# Patient Record
Sex: MALE | Race: WHITE | NOT HISPANIC OR LATINO | URBAN - METROPOLITAN AREA
[De-identification: names, ages, dates, MRNs, and addresses within clinical notes are randomized per-mention and may not be internally consistent; named-entity substitution may affect disease eponyms.]

---

## 2020-01-01 ENCOUNTER — INPATIENT (INPATIENT)
Facility: HOSPITAL | Age: 85
LOS: 5 days | DRG: 177 | End: 2020-04-18
Attending: INTERNAL MEDICINE | Admitting: INTERNAL MEDICINE
Payer: MEDICARE

## 2020-01-01 VITALS
SYSTOLIC BLOOD PRESSURE: 96 MMHG | TEMPERATURE: 98 F | OXYGEN SATURATION: 80 % | HEART RATE: 59 BPM | DIASTOLIC BLOOD PRESSURE: 54 MMHG | RESPIRATION RATE: 28 BRPM

## 2020-01-01 VITALS
HEART RATE: 60 BPM | DIASTOLIC BLOOD PRESSURE: 40 MMHG | SYSTOLIC BLOOD PRESSURE: 96 MMHG | OXYGEN SATURATION: 88 % | TEMPERATURE: 98 F | RESPIRATION RATE: 28 BRPM

## 2020-01-01 DIAGNOSIS — R09.02 HYPOXEMIA: ICD-10-CM

## 2020-01-01 DIAGNOSIS — I10 ESSENTIAL (PRIMARY) HYPERTENSION: ICD-10-CM

## 2020-01-01 DIAGNOSIS — J96.01 ACUTE RESPIRATORY FAILURE WITH HYPOXIA: ICD-10-CM

## 2020-01-01 DIAGNOSIS — Z29.9 ENCOUNTER FOR PROPHYLACTIC MEASURES, UNSPECIFIED: ICD-10-CM

## 2020-01-01 DIAGNOSIS — N17.9 ACUTE KIDNEY FAILURE, UNSPECIFIED: ICD-10-CM

## 2020-01-01 LAB
ALBUMIN SERPL ELPH-MCNC: 2.2 G/DL — LOW (ref 3.5–5)
ALBUMIN SERPL ELPH-MCNC: 2.2 G/DL — LOW (ref 3.5–5)
ALBUMIN SERPL ELPH-MCNC: 2.3 G/DL — LOW (ref 3.5–5)
ALBUMIN SERPL ELPH-MCNC: 2.4 G/DL — LOW (ref 3.5–5)
ALBUMIN SERPL ELPH-MCNC: 2.7 G/DL — LOW (ref 3.5–5)
ALBUMIN SERPL ELPH-MCNC: 3 G/DL — LOW (ref 3.5–5)
ALP SERPL-CCNC: 105 U/L — SIGNIFICANT CHANGE UP (ref 40–120)
ALP SERPL-CCNC: 107 U/L — SIGNIFICANT CHANGE UP (ref 40–120)
ALP SERPL-CCNC: 109 U/L — SIGNIFICANT CHANGE UP (ref 40–120)
ALP SERPL-CCNC: 109 U/L — SIGNIFICANT CHANGE UP (ref 40–120)
ALP SERPL-CCNC: 126 U/L — HIGH (ref 40–120)
ALP SERPL-CCNC: 149 U/L — HIGH (ref 40–120)
ALT FLD-CCNC: 10 U/L DA — SIGNIFICANT CHANGE UP (ref 10–60)
ALT FLD-CCNC: 116 U/L DA — HIGH (ref 10–60)
ALT FLD-CCNC: 12 U/L DA — SIGNIFICANT CHANGE UP (ref 10–60)
ALT FLD-CCNC: 13 U/L DA — SIGNIFICANT CHANGE UP (ref 10–60)
ALT FLD-CCNC: 13 U/L DA — SIGNIFICANT CHANGE UP (ref 10–60)
ALT FLD-CCNC: 14 U/L DA — SIGNIFICANT CHANGE UP (ref 10–60)
ANION GAP SERPL CALC-SCNC: 14 MMOL/L — SIGNIFICANT CHANGE UP (ref 5–17)
ANION GAP SERPL CALC-SCNC: 3 MMOL/L — LOW (ref 5–17)
ANION GAP SERPL CALC-SCNC: 6 MMOL/L — SIGNIFICANT CHANGE UP (ref 5–17)
ANION GAP SERPL CALC-SCNC: 6 MMOL/L — SIGNIFICANT CHANGE UP (ref 5–17)
ANION GAP SERPL CALC-SCNC: 7 MMOL/L — SIGNIFICANT CHANGE UP (ref 5–17)
ANION GAP SERPL CALC-SCNC: 8 MMOL/L — SIGNIFICANT CHANGE UP (ref 5–17)
APPEARANCE UR: CLEAR — SIGNIFICANT CHANGE UP
APTT BLD: 35.4 SEC — SIGNIFICANT CHANGE UP (ref 27.5–36.3)
AST SERPL-CCNC: 124 U/L — HIGH (ref 10–40)
AST SERPL-CCNC: 15 U/L — SIGNIFICANT CHANGE UP (ref 10–40)
AST SERPL-CCNC: 15 U/L — SIGNIFICANT CHANGE UP (ref 10–40)
AST SERPL-CCNC: 17 U/L — SIGNIFICANT CHANGE UP (ref 10–40)
AST SERPL-CCNC: 26 U/L — SIGNIFICANT CHANGE UP (ref 10–40)
AST SERPL-CCNC: 27 U/L — SIGNIFICANT CHANGE UP (ref 10–40)
BASOPHILS # BLD AUTO: 0 K/UL — SIGNIFICANT CHANGE UP (ref 0–0.2)
BASOPHILS # BLD AUTO: 0.01 K/UL — SIGNIFICANT CHANGE UP (ref 0–0.2)
BASOPHILS # BLD AUTO: 0.02 K/UL — SIGNIFICANT CHANGE UP (ref 0–0.2)
BASOPHILS # BLD AUTO: 0.1 K/UL — SIGNIFICANT CHANGE UP (ref 0–0.2)
BASOPHILS NFR BLD AUTO: 0 % — SIGNIFICANT CHANGE UP (ref 0–2)
BASOPHILS NFR BLD AUTO: 0.2 % — SIGNIFICANT CHANGE UP (ref 0–2)
BASOPHILS NFR BLD AUTO: 0.3 % — SIGNIFICANT CHANGE UP (ref 0–2)
BASOPHILS NFR BLD AUTO: 0.5 % — SIGNIFICANT CHANGE UP (ref 0–2)
BILIRUB SERPL-MCNC: 0.6 MG/DL — SIGNIFICANT CHANGE UP (ref 0.2–1.2)
BILIRUB SERPL-MCNC: 0.7 MG/DL — SIGNIFICANT CHANGE UP (ref 0.2–1.2)
BILIRUB SERPL-MCNC: 1 MG/DL — SIGNIFICANT CHANGE UP (ref 0.2–1.2)
BILIRUB SERPL-MCNC: 1.1 MG/DL — SIGNIFICANT CHANGE UP (ref 0.2–1.2)
BILIRUB UR-MCNC: ABNORMAL
BUN SERPL-MCNC: 35 MG/DL — HIGH (ref 7–18)
BUN SERPL-MCNC: 40 MG/DL — HIGH (ref 7–18)
BUN SERPL-MCNC: 43 MG/DL — HIGH (ref 7–18)
BUN SERPL-MCNC: 44 MG/DL — HIGH (ref 7–18)
BUN SERPL-MCNC: 46 MG/DL — HIGH (ref 7–18)
BUN SERPL-MCNC: 60 MG/DL — HIGH (ref 7–18)
CALCIUM SERPL-MCNC: 8.1 MG/DL — LOW (ref 8.4–10.5)
CALCIUM SERPL-MCNC: 8.5 MG/DL — SIGNIFICANT CHANGE UP (ref 8.4–10.5)
CALCIUM SERPL-MCNC: 8.6 MG/DL — SIGNIFICANT CHANGE UP (ref 8.4–10.5)
CALCIUM SERPL-MCNC: 8.6 MG/DL — SIGNIFICANT CHANGE UP (ref 8.4–10.5)
CALCIUM SERPL-MCNC: 8.9 MG/DL — SIGNIFICANT CHANGE UP (ref 8.4–10.5)
CALCIUM SERPL-MCNC: 9 MG/DL — SIGNIFICANT CHANGE UP (ref 8.4–10.5)
CHLORIDE SERPL-SCNC: 101 MMOL/L — SIGNIFICANT CHANGE UP (ref 96–108)
CHLORIDE SERPL-SCNC: 105 MMOL/L — SIGNIFICANT CHANGE UP (ref 96–108)
CHLORIDE SERPL-SCNC: 106 MMOL/L — SIGNIFICANT CHANGE UP (ref 96–108)
CHLORIDE SERPL-SCNC: 106 MMOL/L — SIGNIFICANT CHANGE UP (ref 96–108)
CHLORIDE SERPL-SCNC: 109 MMOL/L — HIGH (ref 96–108)
CHLORIDE SERPL-SCNC: 110 MMOL/L — HIGH (ref 96–108)
CK SERPL-CCNC: 19 U/L — LOW (ref 35–232)
CK SERPL-CCNC: 24 U/L — LOW (ref 35–232)
CK SERPL-CCNC: 79 U/L — SIGNIFICANT CHANGE UP (ref 35–232)
CO2 SERPL-SCNC: 22 MMOL/L — SIGNIFICANT CHANGE UP (ref 22–31)
CO2 SERPL-SCNC: 27 MMOL/L — SIGNIFICANT CHANGE UP (ref 22–31)
CO2 SERPL-SCNC: 28 MMOL/L — SIGNIFICANT CHANGE UP (ref 22–31)
CO2 SERPL-SCNC: 28 MMOL/L — SIGNIFICANT CHANGE UP (ref 22–31)
CO2 SERPL-SCNC: 29 MMOL/L — SIGNIFICANT CHANGE UP (ref 22–31)
CO2 SERPL-SCNC: 31 MMOL/L — SIGNIFICANT CHANGE UP (ref 22–31)
COLOR SPEC: YELLOW — SIGNIFICANT CHANGE UP
CREAT SERPL-MCNC: 0.99 MG/DL — SIGNIFICANT CHANGE UP (ref 0.5–1.3)
CREAT SERPL-MCNC: 1.18 MG/DL — SIGNIFICANT CHANGE UP (ref 0.5–1.3)
CREAT SERPL-MCNC: 1.22 MG/DL — SIGNIFICANT CHANGE UP (ref 0.5–1.3)
CREAT SERPL-MCNC: 1.31 MG/DL — HIGH (ref 0.5–1.3)
CREAT SERPL-MCNC: 1.55 MG/DL — HIGH (ref 0.5–1.3)
CREAT SERPL-MCNC: 2.09 MG/DL — HIGH (ref 0.5–1.3)
CRP SERPL-MCNC: 1.89 MG/DL — HIGH (ref 0–0.4)
CRP SERPL-MCNC: 1.9 MG/DL — HIGH (ref 0–0.4)
CRP SERPL-MCNC: 13.39 MG/DL — HIGH (ref 0–0.4)
CRP SERPL-MCNC: 3.2 MG/DL — HIGH (ref 0–0.4)
CRP SERPL-MCNC: 6.34 MG/DL — HIGH (ref 0–0.4)
D DIMER BLD IA.RAPID-MCNC: 3662 NG/ML DDU — HIGH
D DIMER BLD IA.RAPID-MCNC: HIGH NG/ML DDU
DIFF PNL FLD: ABNORMAL
EOSINOPHIL # BLD AUTO: 0 K/UL — SIGNIFICANT CHANGE UP (ref 0–0.5)
EOSINOPHIL # BLD AUTO: 0.06 K/UL — SIGNIFICANT CHANGE UP (ref 0–0.5)
EOSINOPHIL # BLD AUTO: 0.08 K/UL — SIGNIFICANT CHANGE UP (ref 0–0.5)
EOSINOPHIL NFR BLD AUTO: 0 % — SIGNIFICANT CHANGE UP (ref 0–6)
EOSINOPHIL NFR BLD AUTO: 0.3 % — SIGNIFICANT CHANGE UP (ref 0–6)
EOSINOPHIL NFR BLD AUTO: 1 % — SIGNIFICANT CHANGE UP (ref 0–6)
FERRITIN SERPL-MCNC: 560 NG/ML — HIGH (ref 30–400)
FERRITIN SERPL-MCNC: 784 NG/ML — HIGH (ref 30–400)
GLUCOSE BLDC GLUCOMTR-MCNC: 104 MG/DL — HIGH (ref 70–99)
GLUCOSE BLDC GLUCOMTR-MCNC: 107 MG/DL — HIGH (ref 70–99)
GLUCOSE BLDC GLUCOMTR-MCNC: 112 MG/DL — HIGH (ref 70–99)
GLUCOSE BLDC GLUCOMTR-MCNC: 115 MG/DL — HIGH (ref 70–99)
GLUCOSE BLDC GLUCOMTR-MCNC: 117 MG/DL — HIGH (ref 70–99)
GLUCOSE BLDC GLUCOMTR-MCNC: 121 MG/DL — HIGH (ref 70–99)
GLUCOSE BLDC GLUCOMTR-MCNC: 133 MG/DL — HIGH (ref 70–99)
GLUCOSE BLDC GLUCOMTR-MCNC: 135 MG/DL — HIGH (ref 70–99)
GLUCOSE BLDC GLUCOMTR-MCNC: 139 MG/DL — HIGH (ref 70–99)
GLUCOSE BLDC GLUCOMTR-MCNC: 145 MG/DL — HIGH (ref 70–99)
GLUCOSE BLDC GLUCOMTR-MCNC: 151 MG/DL — HIGH (ref 70–99)
GLUCOSE BLDC GLUCOMTR-MCNC: 152 MG/DL — HIGH (ref 70–99)
GLUCOSE BLDC GLUCOMTR-MCNC: 161 MG/DL — HIGH (ref 70–99)
GLUCOSE BLDC GLUCOMTR-MCNC: 162 MG/DL — HIGH (ref 70–99)
GLUCOSE BLDC GLUCOMTR-MCNC: 162 MG/DL — HIGH (ref 70–99)
GLUCOSE BLDC GLUCOMTR-MCNC: 165 MG/DL — HIGH (ref 70–99)
GLUCOSE BLDC GLUCOMTR-MCNC: 184 MG/DL — HIGH (ref 70–99)
GLUCOSE BLDC GLUCOMTR-MCNC: 187 MG/DL — HIGH (ref 70–99)
GLUCOSE BLDC GLUCOMTR-MCNC: 217 MG/DL — HIGH (ref 70–99)
GLUCOSE BLDC GLUCOMTR-MCNC: 225 MG/DL — HIGH (ref 70–99)
GLUCOSE BLDC GLUCOMTR-MCNC: 88 MG/DL — SIGNIFICANT CHANGE UP (ref 70–99)
GLUCOSE BLDC GLUCOMTR-MCNC: 95 MG/DL — SIGNIFICANT CHANGE UP (ref 70–99)
GLUCOSE SERPL-MCNC: 102 MG/DL — HIGH (ref 70–99)
GLUCOSE SERPL-MCNC: 113 MG/DL — HIGH (ref 70–99)
GLUCOSE SERPL-MCNC: 118 MG/DL — HIGH (ref 70–99)
GLUCOSE SERPL-MCNC: 120 MG/DL — HIGH (ref 70–99)
GLUCOSE SERPL-MCNC: 125 MG/DL — HIGH (ref 70–99)
GLUCOSE SERPL-MCNC: 153 MG/DL — HIGH (ref 70–99)
GLUCOSE UR QL: NEGATIVE — SIGNIFICANT CHANGE UP
HCT VFR BLD CALC: 36.9 % — LOW (ref 39–50)
HCT VFR BLD CALC: 39.3 % — SIGNIFICANT CHANGE UP (ref 39–50)
HCT VFR BLD CALC: 40.3 % — SIGNIFICANT CHANGE UP (ref 39–50)
HCT VFR BLD CALC: 41.1 % — SIGNIFICANT CHANGE UP (ref 39–50)
HCT VFR BLD CALC: 45.6 % — SIGNIFICANT CHANGE UP (ref 39–50)
HCT VFR BLD CALC: 47.9 % — SIGNIFICANT CHANGE UP (ref 39–50)
HGB BLD-MCNC: 11.8 G/DL — LOW (ref 13–17)
HGB BLD-MCNC: 12.5 G/DL — LOW (ref 13–17)
HGB BLD-MCNC: 12.7 G/DL — LOW (ref 13–17)
HGB BLD-MCNC: 12.9 G/DL — LOW (ref 13–17)
HGB BLD-MCNC: 14.3 G/DL — SIGNIFICANT CHANGE UP (ref 13–17)
HGB BLD-MCNC: 15.2 G/DL — SIGNIFICANT CHANGE UP (ref 13–17)
IMM GRANULOCYTES NFR BLD AUTO: 1.2 % — SIGNIFICANT CHANGE UP (ref 0–1.5)
IMM GRANULOCYTES NFR BLD AUTO: 2 % — HIGH (ref 0–1.5)
IMM GRANULOCYTES NFR BLD AUTO: 2.6 % — HIGH (ref 0–1.5)
IMM GRANULOCYTES NFR BLD AUTO: 4.3 % — HIGH (ref 0–1.5)
INR BLD: 1.26 RATIO — HIGH (ref 0.88–1.16)
KETONES UR-MCNC: ABNORMAL
LDH SERPL L TO P-CCNC: 297 U/L — HIGH (ref 120–225)
LEUKOCYTE ESTERASE UR-ACNC: ABNORMAL
LYMPHOCYTES # BLD AUTO: 0.25 K/UL — LOW (ref 1–3.3)
LYMPHOCYTES # BLD AUTO: 0.26 K/UL — LOW (ref 1–3.3)
LYMPHOCYTES # BLD AUTO: 0.26 K/UL — LOW (ref 1–3.3)
LYMPHOCYTES # BLD AUTO: 0.27 K/UL — LOW (ref 1–3.3)
LYMPHOCYTES # BLD AUTO: 0.28 K/UL — LOW (ref 1–3.3)
LYMPHOCYTES # BLD AUTO: 0.61 K/UL — LOW (ref 1–3.3)
LYMPHOCYTES # BLD AUTO: 1.2 % — LOW (ref 13–44)
LYMPHOCYTES # BLD AUTO: 4.4 % — LOW (ref 13–44)
LYMPHOCYTES # BLD AUTO: 4.8 % — LOW (ref 13–44)
LYMPHOCYTES # BLD AUTO: 5 % — LOW (ref 13–44)
LYMPHOCYTES # BLD AUTO: 5.8 % — LOW (ref 13–44)
LYMPHOCYTES # BLD AUTO: 8 % — LOW (ref 13–44)
MAGNESIUM SERPL-MCNC: 2.7 MG/DL — HIGH (ref 1.6–2.6)
MAGNESIUM SERPL-MCNC: 2.7 MG/DL — HIGH (ref 1.6–2.6)
MAGNESIUM SERPL-MCNC: 2.8 MG/DL — HIGH (ref 1.6–2.6)
MAGNESIUM SERPL-MCNC: 2.8 MG/DL — HIGH (ref 1.6–2.6)
MAGNESIUM SERPL-MCNC: 3 MG/DL — HIGH (ref 1.6–2.6)
MCHC RBC-ENTMCNC: 29.9 PG — SIGNIFICANT CHANGE UP (ref 27–34)
MCHC RBC-ENTMCNC: 30.6 PG — SIGNIFICANT CHANGE UP (ref 27–34)
MCHC RBC-ENTMCNC: 30.6 PG — SIGNIFICANT CHANGE UP (ref 27–34)
MCHC RBC-ENTMCNC: 30.9 PG — SIGNIFICANT CHANGE UP (ref 27–34)
MCHC RBC-ENTMCNC: 30.9 PG — SIGNIFICANT CHANGE UP (ref 27–34)
MCHC RBC-ENTMCNC: 31.3 PG — SIGNIFICANT CHANGE UP (ref 27–34)
MCHC RBC-ENTMCNC: 31.4 GM/DL — LOW (ref 32–36)
MCHC RBC-ENTMCNC: 31.4 GM/DL — LOW (ref 32–36)
MCHC RBC-ENTMCNC: 31.5 GM/DL — LOW (ref 32–36)
MCHC RBC-ENTMCNC: 31.7 GM/DL — LOW (ref 32–36)
MCHC RBC-ENTMCNC: 31.8 GM/DL — LOW (ref 32–36)
MCHC RBC-ENTMCNC: 32 GM/DL — SIGNIFICANT CHANGE UP (ref 32–36)
MCV RBC AUTO: 95.4 FL — SIGNIFICANT CHANGE UP (ref 80–100)
MCV RBC AUTO: 95.6 FL — SIGNIFICANT CHANGE UP (ref 80–100)
MCV RBC AUTO: 96.3 FL — SIGNIFICANT CHANGE UP (ref 80–100)
MCV RBC AUTO: 97.4 FL — SIGNIFICANT CHANGE UP (ref 80–100)
MCV RBC AUTO: 98.5 FL — SIGNIFICANT CHANGE UP (ref 80–100)
MCV RBC AUTO: 99.3 FL — SIGNIFICANT CHANGE UP (ref 80–100)
MONOCYTES # BLD AUTO: 0.11 K/UL — SIGNIFICANT CHANGE UP (ref 0–0.9)
MONOCYTES # BLD AUTO: 0.16 K/UL — SIGNIFICANT CHANGE UP (ref 0–0.9)
MONOCYTES # BLD AUTO: 0.17 K/UL — SIGNIFICANT CHANGE UP (ref 0–0.9)
MONOCYTES # BLD AUTO: 0.24 K/UL — SIGNIFICANT CHANGE UP (ref 0–0.9)
MONOCYTES # BLD AUTO: 0.69 K/UL — SIGNIFICANT CHANGE UP (ref 0–0.9)
MONOCYTES # BLD AUTO: 1.8 K/UL — HIGH (ref 0–0.9)
MONOCYTES NFR BLD AUTO: 2.4 % — SIGNIFICANT CHANGE UP (ref 2–14)
MONOCYTES NFR BLD AUTO: 2.9 % — SIGNIFICANT CHANGE UP (ref 2–14)
MONOCYTES NFR BLD AUTO: 3 % — SIGNIFICANT CHANGE UP (ref 2–14)
MONOCYTES NFR BLD AUTO: 4.1 % — SIGNIFICANT CHANGE UP (ref 2–14)
MONOCYTES NFR BLD AUTO: 8.5 % — SIGNIFICANT CHANGE UP (ref 2–14)
MONOCYTES NFR BLD AUTO: 9 % — SIGNIFICANT CHANGE UP (ref 2–14)
NEUTROPHILS # BLD AUTO: 18.3 K/UL — HIGH (ref 1.8–7.4)
NEUTROPHILS # BLD AUTO: 4.03 K/UL — SIGNIFICANT CHANGE UP (ref 1.8–7.4)
NEUTROPHILS # BLD AUTO: 4.74 K/UL — SIGNIFICANT CHANGE UP (ref 1.8–7.4)
NEUTROPHILS # BLD AUTO: 5.04 K/UL — SIGNIFICANT CHANGE UP (ref 1.8–7.4)
NEUTROPHILS # BLD AUTO: 5.43 K/UL — SIGNIFICANT CHANGE UP (ref 1.8–7.4)
NEUTROPHILS # BLD AUTO: 6.18 K/UL — SIGNIFICANT CHANGE UP (ref 1.8–7.4)
NEUTROPHILS NFR BLD AUTO: 81 % — HIGH (ref 43–77)
NEUTROPHILS NFR BLD AUTO: 86.5 % — HIGH (ref 43–77)
NEUTROPHILS NFR BLD AUTO: 86.9 % — HIGH (ref 43–77)
NEUTROPHILS NFR BLD AUTO: 87 % — HIGH (ref 43–77)
NEUTROPHILS NFR BLD AUTO: 89.6 % — HIGH (ref 43–77)
NEUTROPHILS NFR BLD AUTO: 91.5 % — HIGH (ref 43–77)
NITRITE UR-MCNC: NEGATIVE — SIGNIFICANT CHANGE UP
NRBC # BLD: 0 /100 WBCS — SIGNIFICANT CHANGE UP (ref 0–0)
NT-PROBNP SERPL-SCNC: 7753 PG/ML — HIGH (ref 0–450)
PH UR: 5 — SIGNIFICANT CHANGE UP (ref 5–8)
PHOSPHATE SERPL-MCNC: 3.1 MG/DL — SIGNIFICANT CHANGE UP (ref 2.5–4.5)
PHOSPHATE SERPL-MCNC: 3.6 MG/DL — SIGNIFICANT CHANGE UP (ref 2.5–4.5)
PHOSPHATE SERPL-MCNC: 6.1 MG/DL — HIGH (ref 2.5–4.5)
PLATELET # BLD AUTO: 105 K/UL — LOW (ref 150–400)
PLATELET # BLD AUTO: 119 K/UL — LOW (ref 150–400)
PLATELET # BLD AUTO: 137 K/UL — LOW (ref 150–400)
PLATELET # BLD AUTO: 164 K/UL — SIGNIFICANT CHANGE UP (ref 150–400)
PLATELET # BLD AUTO: 241 K/UL — SIGNIFICANT CHANGE UP (ref 150–400)
PLATELET # BLD AUTO: 83 K/UL — LOW (ref 150–400)
POTASSIUM SERPL-MCNC: 4.1 MMOL/L — SIGNIFICANT CHANGE UP (ref 3.5–5.3)
POTASSIUM SERPL-MCNC: 4.3 MMOL/L — SIGNIFICANT CHANGE UP (ref 3.5–5.3)
POTASSIUM SERPL-MCNC: 4.4 MMOL/L — SIGNIFICANT CHANGE UP (ref 3.5–5.3)
POTASSIUM SERPL-MCNC: 4.5 MMOL/L — SIGNIFICANT CHANGE UP (ref 3.5–5.3)
POTASSIUM SERPL-MCNC: 4.6 MMOL/L — SIGNIFICANT CHANGE UP (ref 3.5–5.3)
POTASSIUM SERPL-MCNC: 5.6 MMOL/L — HIGH (ref 3.5–5.3)
POTASSIUM SERPL-SCNC: 4.1 MMOL/L — SIGNIFICANT CHANGE UP (ref 3.5–5.3)
POTASSIUM SERPL-SCNC: 4.3 MMOL/L — SIGNIFICANT CHANGE UP (ref 3.5–5.3)
POTASSIUM SERPL-SCNC: 4.4 MMOL/L — SIGNIFICANT CHANGE UP (ref 3.5–5.3)
POTASSIUM SERPL-SCNC: 4.5 MMOL/L — SIGNIFICANT CHANGE UP (ref 3.5–5.3)
POTASSIUM SERPL-SCNC: 4.6 MMOL/L — SIGNIFICANT CHANGE UP (ref 3.5–5.3)
POTASSIUM SERPL-SCNC: 5.6 MMOL/L — HIGH (ref 3.5–5.3)
PROT SERPL-MCNC: 6.5 G/DL — SIGNIFICANT CHANGE UP (ref 6–8.3)
PROT SERPL-MCNC: 6.9 G/DL — SIGNIFICANT CHANGE UP (ref 6–8.3)
PROT SERPL-MCNC: 7.8 G/DL — SIGNIFICANT CHANGE UP (ref 6–8.3)
PROT SERPL-MCNC: 7.9 G/DL — SIGNIFICANT CHANGE UP (ref 6–8.3)
PROT UR-MCNC: 100
PROTHROM AB SERPL-ACNC: 14.3 SEC — HIGH (ref 10–12.9)
RBC # BLD: 3.86 M/UL — LOW (ref 4.2–5.8)
RBC # BLD: 4.06 M/UL — LOW (ref 4.2–5.8)
RBC # BLD: 4.08 M/UL — LOW (ref 4.2–5.8)
RBC # BLD: 4.31 M/UL — SIGNIFICANT CHANGE UP (ref 4.2–5.8)
RBC # BLD: 4.63 M/UL — SIGNIFICANT CHANGE UP (ref 4.2–5.8)
RBC # BLD: 4.92 M/UL — SIGNIFICANT CHANGE UP (ref 4.2–5.8)
RBC # FLD: 12.7 % — SIGNIFICANT CHANGE UP (ref 10.3–14.5)
RBC # FLD: 12.8 % — SIGNIFICANT CHANGE UP (ref 10.3–14.5)
RBC # FLD: 13 % — SIGNIFICANT CHANGE UP (ref 10.3–14.5)
RBC # FLD: 13.1 % — SIGNIFICANT CHANGE UP (ref 10.3–14.5)
RBC # FLD: 13.2 % — SIGNIFICANT CHANGE UP (ref 10.3–14.5)
RBC # FLD: 13.2 % — SIGNIFICANT CHANGE UP (ref 10.3–14.5)
SARS-COV-2 RNA SPEC QL NAA+PROBE: DETECTED
SODIUM SERPL-SCNC: 137 MMOL/L — SIGNIFICANT CHANGE UP (ref 135–145)
SODIUM SERPL-SCNC: 139 MMOL/L — SIGNIFICANT CHANGE UP (ref 135–145)
SODIUM SERPL-SCNC: 140 MMOL/L — SIGNIFICANT CHANGE UP (ref 135–145)
SODIUM SERPL-SCNC: 141 MMOL/L — SIGNIFICANT CHANGE UP (ref 135–145)
SODIUM SERPL-SCNC: 144 MMOL/L — SIGNIFICANT CHANGE UP (ref 135–145)
SODIUM SERPL-SCNC: 145 MMOL/L — SIGNIFICANT CHANGE UP (ref 135–145)
SP GR SPEC: 1.01 — SIGNIFICANT CHANGE UP (ref 1.01–1.02)
TROPONIN I SERPL-MCNC: 0.03 NG/ML — SIGNIFICANT CHANGE UP (ref 0–0.04)
TROPONIN I SERPL-MCNC: 0.04 NG/ML — SIGNIFICANT CHANGE UP (ref 0–0.04)
TROPONIN I SERPL-MCNC: 0.05 NG/ML — HIGH (ref 0–0.04)
TROPONIN I SERPL-MCNC: 0.08 NG/ML — HIGH (ref 0–0.04)
TROPONIN I SERPL-MCNC: 1.77 NG/ML — HIGH (ref 0–0.04)
UROBILINOGEN FLD QL: 1
WBC # BLD: 21.06 K/UL — HIGH (ref 3.8–10.5)
WBC # BLD: 4.5 K/UL — SIGNIFICANT CHANGE UP (ref 3.8–10.5)
WBC # BLD: 5.39 K/UL — SIGNIFICANT CHANGE UP (ref 3.8–10.5)
WBC # BLD: 5.83 K/UL — SIGNIFICANT CHANGE UP (ref 3.8–10.5)
WBC # BLD: 5.93 K/UL — SIGNIFICANT CHANGE UP (ref 3.8–10.5)
WBC # BLD: 7.63 K/UL — SIGNIFICANT CHANGE UP (ref 3.8–10.5)
WBC # FLD AUTO: 21.06 K/UL — HIGH (ref 3.8–10.5)
WBC # FLD AUTO: 4.5 K/UL — SIGNIFICANT CHANGE UP (ref 3.8–10.5)
WBC # FLD AUTO: 5.39 K/UL — SIGNIFICANT CHANGE UP (ref 3.8–10.5)
WBC # FLD AUTO: 5.83 K/UL — SIGNIFICANT CHANGE UP (ref 3.8–10.5)
WBC # FLD AUTO: 5.93 K/UL — SIGNIFICANT CHANGE UP (ref 3.8–10.5)
WBC # FLD AUTO: 7.63 K/UL — SIGNIFICANT CHANGE UP (ref 3.8–10.5)

## 2020-01-01 PROCEDURE — 71045 X-RAY EXAM CHEST 1 VIEW: CPT | Mod: 26,CS

## 2020-01-01 PROCEDURE — 99285 EMERGENCY DEPT VISIT HI MDM: CPT | Mod: CS

## 2020-01-01 PROCEDURE — 71045 X-RAY EXAM CHEST 1 VIEW: CPT | Mod: 26

## 2020-01-01 RX ORDER — TIOTROPIUM BROMIDE 18 UG/1
1 CAPSULE ORAL; RESPIRATORY (INHALATION) DAILY
Refills: 0 | Status: DISCONTINUED | OUTPATIENT
Start: 2020-01-01 | End: 2020-01-01

## 2020-01-01 RX ORDER — HYDROXYCHLOROQUINE SULFATE 200 MG
1 TABLET ORAL
Qty: 0 | Refills: 0 | DISCHARGE

## 2020-01-01 RX ORDER — ENOXAPARIN SODIUM 100 MG/ML
70 INJECTION SUBCUTANEOUS
Refills: 0 | Status: DISCONTINUED | OUTPATIENT
Start: 2020-01-01 | End: 2020-01-01

## 2020-01-01 RX ORDER — TIOTROPIUM BROMIDE 18 UG/1
1 CAPSULE ORAL; RESPIRATORY (INHALATION)
Qty: 0 | Refills: 0 | DISCHARGE

## 2020-01-01 RX ORDER — OXYBUTYNIN CHLORIDE 5 MG
1 TABLET ORAL
Qty: 0 | Refills: 0 | DISCHARGE

## 2020-01-01 RX ORDER — SIMVASTATIN 20 MG/1
1 TABLET, FILM COATED ORAL
Qty: 0 | Refills: 0 | DISCHARGE

## 2020-01-01 RX ORDER — ALLOPURINOL 300 MG
0.5 TABLET ORAL
Qty: 0 | Refills: 0 | DISCHARGE

## 2020-01-01 RX ORDER — FUROSEMIDE 40 MG
1 TABLET ORAL
Qty: 0 | Refills: 0 | DISCHARGE

## 2020-01-01 RX ORDER — FUROSEMIDE 40 MG
40 TABLET ORAL ONCE
Refills: 0 | Status: COMPLETED | OUTPATIENT
Start: 2020-01-01 | End: 2020-01-01

## 2020-01-01 RX ORDER — LOSARTAN POTASSIUM 100 MG/1
1 TABLET, FILM COATED ORAL
Qty: 0 | Refills: 0 | DISCHARGE

## 2020-01-01 RX ORDER — SODIUM CHLORIDE 9 MG/ML
1000 INJECTION INTRAMUSCULAR; INTRAVENOUS; SUBCUTANEOUS ONCE
Refills: 0 | Status: COMPLETED | OUTPATIENT
Start: 2020-01-01 | End: 2020-01-01

## 2020-01-01 RX ORDER — ANAKINRA 100MG/0.67
SYRINGE (ML) SUBCUTANEOUS
Refills: 0 | Status: DISCONTINUED | OUTPATIENT
Start: 2020-01-01 | End: 2020-01-01

## 2020-01-01 RX ORDER — ACETAMINOPHEN 500 MG
650 TABLET ORAL EVERY 6 HOURS
Refills: 0 | Status: DISCONTINUED | OUTPATIENT
Start: 2020-01-01 | End: 2020-01-01

## 2020-01-01 RX ORDER — ASPIRIN/CALCIUM CARB/MAGNESIUM 324 MG
81 TABLET ORAL ONCE
Refills: 0 | Status: COMPLETED | OUTPATIENT
Start: 2020-01-01 | End: 2020-01-01

## 2020-01-01 RX ORDER — ASCORBIC ACID 60 MG
500 TABLET,CHEWABLE ORAL DAILY
Refills: 0 | Status: DISCONTINUED | OUTPATIENT
Start: 2020-01-01 | End: 2020-01-01

## 2020-01-01 RX ORDER — CALCIUM GLUCONATE 100 MG/ML
1 VIAL (ML) INTRAVENOUS ONCE
Refills: 0 | Status: COMPLETED | OUTPATIENT
Start: 2020-01-01 | End: 2020-01-01

## 2020-01-01 RX ORDER — ERGOCALCIFEROL 1.25 MG/1
50000 CAPSULE ORAL DAILY
Refills: 0 | Status: COMPLETED | OUTPATIENT
Start: 2020-01-01 | End: 2020-01-01

## 2020-01-01 RX ORDER — TAMSULOSIN HYDROCHLORIDE 0.4 MG/1
0.4 CAPSULE ORAL AT BEDTIME
Refills: 0 | Status: DISCONTINUED | OUTPATIENT
Start: 2020-01-01 | End: 2020-01-01

## 2020-01-01 RX ORDER — TAMSULOSIN HYDROCHLORIDE 0.4 MG/1
1 CAPSULE ORAL
Qty: 0 | Refills: 0 | DISCHARGE

## 2020-01-01 RX ORDER — HEPARIN SODIUM 5000 [USP'U]/ML
5000 INJECTION INTRAVENOUS; SUBCUTANEOUS EVERY 8 HOURS
Refills: 0 | Status: DISCONTINUED | OUTPATIENT
Start: 2020-01-01 | End: 2020-01-01

## 2020-01-01 RX ORDER — ALBUTEROL 90 UG/1
2 AEROSOL, METERED ORAL EVERY 6 HOURS
Refills: 0 | Status: DISCONTINUED | OUTPATIENT
Start: 2020-01-01 | End: 2020-01-01

## 2020-01-01 RX ORDER — AZITHROMYCIN 500 MG/1
0 TABLET, FILM COATED ORAL
Qty: 0 | Refills: 0 | DISCHARGE

## 2020-01-01 RX ORDER — INSULIN HUMAN 100 [IU]/ML
10 INJECTION, SOLUTION SUBCUTANEOUS ONCE
Refills: 0 | Status: DISCONTINUED | OUTPATIENT
Start: 2020-01-01 | End: 2020-01-01

## 2020-01-01 RX ORDER — ANAKINRA 100MG/0.67
100 SYRINGE (ML) SUBCUTANEOUS EVERY 6 HOURS
Refills: 0 | Status: COMPLETED | OUTPATIENT
Start: 2020-01-01 | End: 2020-01-01

## 2020-01-01 RX ORDER — MORPHINE SULFATE 50 MG/1
1 CAPSULE, EXTENDED RELEASE ORAL
Refills: 0 | Status: DISCONTINUED | OUTPATIENT
Start: 2020-01-01 | End: 2020-01-01

## 2020-01-01 RX ORDER — DOCUSATE SODIUM 100 MG
1 CAPSULE ORAL
Qty: 0 | Refills: 0 | DISCHARGE

## 2020-01-01 RX ORDER — POLYETHYLENE GLYCOL 3350 17 G/17G
17 POWDER, FOR SOLUTION ORAL DAILY
Refills: 0 | Status: DISCONTINUED | OUTPATIENT
Start: 2020-01-01 | End: 2020-01-01

## 2020-01-01 RX ORDER — ANAKINRA 100MG/0.67
100 SYRINGE (ML) SUBCUTANEOUS EVERY 12 HOURS
Refills: 0 | Status: DISCONTINUED | OUTPATIENT
Start: 2020-01-01 | End: 2020-01-01

## 2020-01-01 RX ORDER — DEXTROSE 50 % IN WATER 50 %
50 SYRINGE (ML) INTRAVENOUS ONCE
Refills: 0 | Status: COMPLETED | OUTPATIENT
Start: 2020-01-01 | End: 2020-01-01

## 2020-01-01 RX ORDER — INSULIN HUMAN 100 [IU]/ML
5 INJECTION, SOLUTION SUBCUTANEOUS ONCE
Refills: 0 | Status: COMPLETED | OUTPATIENT
Start: 2020-01-01 | End: 2020-01-01

## 2020-01-01 RX ORDER — FLUTICASONE PROPIONATE AND SALMETEROL 50; 250 UG/1; UG/1
2 POWDER ORAL; RESPIRATORY (INHALATION)
Qty: 0 | Refills: 0 | DISCHARGE

## 2020-01-01 RX ORDER — MORPHINE SULFATE 50 MG/1
0.5 CAPSULE, EXTENDED RELEASE ORAL ONCE
Refills: 0 | Status: DISCONTINUED | OUTPATIENT
Start: 2020-01-01 | End: 2020-01-01

## 2020-01-01 RX ORDER — ANAKINRA 100MG/0.67
100 SYRINGE (ML) SUBCUTANEOUS EVERY 24 HOURS
Refills: 0 | Status: CANCELLED | OUTPATIENT
Start: 2020-04-22 | End: 2020-01-01

## 2020-01-01 RX ADMIN — Medication 100 MILLIGRAM(S): at 00:45

## 2020-01-01 RX ADMIN — MORPHINE SULFATE 0.5 MILLIGRAM(S): 50 CAPSULE, EXTENDED RELEASE ORAL at 22:18

## 2020-01-01 RX ADMIN — ENOXAPARIN SODIUM 70 MILLIGRAM(S): 100 INJECTION SUBCUTANEOUS at 17:12

## 2020-01-01 RX ADMIN — ENOXAPARIN SODIUM 70 MILLIGRAM(S): 100 INJECTION SUBCUTANEOUS at 05:40

## 2020-01-01 RX ADMIN — POLYETHYLENE GLYCOL 3350 17 GRAM(S): 17 POWDER, FOR SOLUTION ORAL at 11:41

## 2020-01-01 RX ADMIN — ENOXAPARIN SODIUM 70 MILLIGRAM(S): 100 INJECTION SUBCUTANEOUS at 16:58

## 2020-01-01 RX ADMIN — Medication 40 MILLIGRAM(S): at 05:53

## 2020-01-01 RX ADMIN — TIOTROPIUM BROMIDE 1 CAPSULE(S): 18 CAPSULE ORAL; RESPIRATORY (INHALATION) at 11:45

## 2020-01-01 RX ADMIN — Medication 500 MILLIGRAM(S): at 11:40

## 2020-01-01 RX ADMIN — ALBUTEROL 2 PUFF(S): 90 AEROSOL, METERED ORAL at 05:39

## 2020-01-01 RX ADMIN — ENOXAPARIN SODIUM 70 MILLIGRAM(S): 100 INJECTION SUBCUTANEOUS at 16:44

## 2020-01-01 RX ADMIN — Medication 100 MILLIGRAM(S): at 11:36

## 2020-01-01 RX ADMIN — ALBUTEROL 2 PUFF(S): 90 AEROSOL, METERED ORAL at 17:00

## 2020-01-01 RX ADMIN — TAMSULOSIN HYDROCHLORIDE 0.4 MILLIGRAM(S): 0.4 CAPSULE ORAL at 22:03

## 2020-01-01 RX ADMIN — ALBUTEROL 2 PUFF(S): 90 AEROSOL, METERED ORAL at 16:18

## 2020-01-01 RX ADMIN — Medication 81 MILLIGRAM(S): at 22:22

## 2020-01-01 RX ADMIN — Medication 100 MILLIGRAM(S): at 16:58

## 2020-01-01 RX ADMIN — Medication 40 MILLIGRAM(S): at 05:39

## 2020-01-01 RX ADMIN — TAMSULOSIN HYDROCHLORIDE 0.4 MILLIGRAM(S): 0.4 CAPSULE ORAL at 22:02

## 2020-01-01 RX ADMIN — Medication 500 MILLIGRAM(S): at 12:04

## 2020-01-01 RX ADMIN — ERGOCALCIFEROL 50000 UNIT(S): 1.25 CAPSULE ORAL at 11:36

## 2020-01-01 RX ADMIN — Medication 40 MILLIGRAM(S): at 05:41

## 2020-01-01 RX ADMIN — Medication 100 MILLIGRAM(S): at 16:44

## 2020-01-01 RX ADMIN — ERGOCALCIFEROL 50000 UNIT(S): 1.25 CAPSULE ORAL at 12:04

## 2020-01-01 RX ADMIN — Medication 40 MILLIGRAM(S): at 11:36

## 2020-01-01 RX ADMIN — ALBUTEROL 2 PUFF(S): 90 AEROSOL, METERED ORAL at 13:55

## 2020-01-01 RX ADMIN — HEPARIN SODIUM 5000 UNIT(S): 5000 INJECTION INTRAVENOUS; SUBCUTANEOUS at 05:39

## 2020-01-01 RX ADMIN — ALBUTEROL 2 PUFF(S): 90 AEROSOL, METERED ORAL at 07:49

## 2020-01-01 RX ADMIN — Medication 100 GRAM(S): at 09:19

## 2020-01-01 RX ADMIN — Medication 100 MILLIGRAM(S): at 16:18

## 2020-01-01 RX ADMIN — HEPARIN SODIUM 5000 UNIT(S): 5000 INJECTION INTRAVENOUS; SUBCUTANEOUS at 05:53

## 2020-01-01 RX ADMIN — ERGOCALCIFEROL 50000 UNIT(S): 1.25 CAPSULE ORAL at 11:40

## 2020-01-01 RX ADMIN — MORPHINE SULFATE 1 MILLIGRAM(S): 50 CAPSULE, EXTENDED RELEASE ORAL at 21:03

## 2020-01-01 RX ADMIN — ALBUTEROL 2 PUFF(S): 90 AEROSOL, METERED ORAL at 22:19

## 2020-01-01 RX ADMIN — ALBUTEROL 2 PUFF(S): 90 AEROSOL, METERED ORAL at 11:37

## 2020-01-01 RX ADMIN — Medication 100 MILLIGRAM(S): at 05:41

## 2020-01-01 RX ADMIN — ALBUTEROL 2 PUFF(S): 90 AEROSOL, METERED ORAL at 21:44

## 2020-01-01 RX ADMIN — HEPARIN SODIUM 5000 UNIT(S): 5000 INJECTION INTRAVENOUS; SUBCUTANEOUS at 22:19

## 2020-01-01 RX ADMIN — Medication 100 MILLIGRAM(S): at 05:40

## 2020-01-01 RX ADMIN — Medication 100 MILLIGRAM(S): at 23:52

## 2020-01-01 RX ADMIN — ALBUTEROL 2 PUFF(S): 90 AEROSOL, METERED ORAL at 16:45

## 2020-01-01 RX ADMIN — Medication 100 MILLIGRAM(S): at 05:45

## 2020-01-01 RX ADMIN — Medication 500 MILLIGRAM(S): at 11:18

## 2020-01-01 RX ADMIN — TIOTROPIUM BROMIDE 1 CAPSULE(S): 18 CAPSULE ORAL; RESPIRATORY (INHALATION) at 12:15

## 2020-01-01 RX ADMIN — ALBUTEROL 2 PUFF(S): 90 AEROSOL, METERED ORAL at 14:27

## 2020-01-01 RX ADMIN — ALBUTEROL 2 PUFF(S): 90 AEROSOL, METERED ORAL at 09:37

## 2020-01-01 RX ADMIN — INSULIN HUMAN 5 UNIT(S): 100 INJECTION, SOLUTION SUBCUTANEOUS at 09:19

## 2020-01-01 RX ADMIN — Medication 100 MILLIGRAM(S): at 17:12

## 2020-01-01 RX ADMIN — Medication 40 MILLIGRAM(S): at 16:44

## 2020-01-01 RX ADMIN — Medication 40 MILLIGRAM(S): at 22:25

## 2020-01-01 RX ADMIN — ALBUTEROL 2 PUFF(S): 90 AEROSOL, METERED ORAL at 03:59

## 2020-01-01 RX ADMIN — ENOXAPARIN SODIUM 70 MILLIGRAM(S): 100 INJECTION SUBCUTANEOUS at 05:44

## 2020-01-01 RX ADMIN — TAMSULOSIN HYDROCHLORIDE 0.4 MILLIGRAM(S): 0.4 CAPSULE ORAL at 22:26

## 2020-01-01 RX ADMIN — Medication 500 MILLIGRAM(S): at 11:45

## 2020-01-01 RX ADMIN — TIOTROPIUM BROMIDE 1 CAPSULE(S): 18 CAPSULE ORAL; RESPIRATORY (INHALATION) at 11:20

## 2020-01-01 RX ADMIN — ENOXAPARIN SODIUM 70 MILLIGRAM(S): 100 INJECTION SUBCUTANEOUS at 16:19

## 2020-01-01 RX ADMIN — HEPARIN SODIUM 5000 UNIT(S): 5000 INJECTION INTRAVENOUS; SUBCUTANEOUS at 05:41

## 2020-01-01 RX ADMIN — POLYETHYLENE GLYCOL 3350 17 GRAM(S): 17 POWDER, FOR SOLUTION ORAL at 11:45

## 2020-01-01 RX ADMIN — SODIUM CHLORIDE 1000 MILLILITER(S): 9 INJECTION INTRAMUSCULAR; INTRAVENOUS; SUBCUTANEOUS at 22:19

## 2020-01-01 RX ADMIN — Medication 40 MILLIGRAM(S): at 22:19

## 2020-01-01 RX ADMIN — TAMSULOSIN HYDROCHLORIDE 0.4 MILLIGRAM(S): 0.4 CAPSULE ORAL at 22:19

## 2020-01-01 RX ADMIN — HEPARIN SODIUM 5000 UNIT(S): 5000 INJECTION INTRAVENOUS; SUBCUTANEOUS at 22:25

## 2020-01-01 RX ADMIN — TAMSULOSIN HYDROCHLORIDE 0.4 MILLIGRAM(S): 0.4 CAPSULE ORAL at 22:50

## 2020-01-01 RX ADMIN — Medication 100 MILLIGRAM(S): at 11:47

## 2020-01-01 RX ADMIN — ALBUTEROL 2 PUFF(S): 90 AEROSOL, METERED ORAL at 22:50

## 2020-01-01 RX ADMIN — ALBUTEROL 2 PUFF(S): 90 AEROSOL, METERED ORAL at 22:26

## 2020-01-01 RX ADMIN — HEPARIN SODIUM 5000 UNIT(S): 5000 INJECTION INTRAVENOUS; SUBCUTANEOUS at 12:25

## 2020-01-01 RX ADMIN — ALBUTEROL 2 PUFF(S): 90 AEROSOL, METERED ORAL at 21:35

## 2020-01-01 RX ADMIN — POLYETHYLENE GLYCOL 3350 17 GRAM(S): 17 POWDER, FOR SOLUTION ORAL at 11:36

## 2020-01-01 RX ADMIN — ALBUTEROL 2 PUFF(S): 90 AEROSOL, METERED ORAL at 05:40

## 2020-01-01 RX ADMIN — Medication 50 MILLILITER(S): at 09:19

## 2020-01-01 RX ADMIN — ERGOCALCIFEROL 50000 UNIT(S): 1.25 CAPSULE ORAL at 11:45

## 2020-01-01 RX ADMIN — SODIUM CHLORIDE 1000 MILLILITER(S): 9 INJECTION INTRAMUSCULAR; INTRAVENOUS; SUBCUTANEOUS at 01:48

## 2020-01-01 RX ADMIN — ERGOCALCIFEROL 50000 UNIT(S): 1.25 CAPSULE ORAL at 11:18

## 2020-01-01 RX ADMIN — Medication 40 MILLIGRAM(S): at 05:46

## 2020-01-01 RX ADMIN — HEPARIN SODIUM 5000 UNIT(S): 5000 INJECTION INTRAVENOUS; SUBCUTANEOUS at 12:04

## 2020-01-01 RX ADMIN — TIOTROPIUM BROMIDE 1 CAPSULE(S): 18 CAPSULE ORAL; RESPIRATORY (INHALATION) at 11:36

## 2020-01-01 RX ADMIN — POLYETHYLENE GLYCOL 3350 17 GRAM(S): 17 POWDER, FOR SOLUTION ORAL at 12:04

## 2020-01-01 RX ADMIN — Medication 100 MILLIGRAM(S): at 23:43

## 2020-01-01 RX ADMIN — POLYETHYLENE GLYCOL 3350 17 GRAM(S): 17 POWDER, FOR SOLUTION ORAL at 11:18

## 2020-01-01 RX ADMIN — Medication 500 MILLIGRAM(S): at 11:36

## 2020-01-01 RX ADMIN — Medication 100 MILLIGRAM(S): at 11:20

## 2020-01-01 RX ADMIN — MORPHINE SULFATE 1 MILLIGRAM(S): 50 CAPSULE, EXTENDED RELEASE ORAL at 23:05

## 2020-01-01 RX ADMIN — TAMSULOSIN HYDROCHLORIDE 0.4 MILLIGRAM(S): 0.4 CAPSULE ORAL at 23:31

## 2020-01-01 RX ADMIN — Medication 40 MILLIGRAM(S): at 16:58

## 2020-01-01 RX ADMIN — ALBUTEROL 2 PUFF(S): 90 AEROSOL, METERED ORAL at 11:00

## 2020-01-01 RX ADMIN — Medication 40 MILLIGRAM(S): at 16:19

## 2020-01-01 RX ADMIN — Medication 40 MILLIGRAM(S): at 12:25

## 2020-01-01 RX ADMIN — ALBUTEROL 2 PUFF(S): 90 AEROSOL, METERED ORAL at 12:05

## 2020-01-01 RX ADMIN — TIOTROPIUM BROMIDE 1 CAPSULE(S): 18 CAPSULE ORAL; RESPIRATORY (INHALATION) at 11:40

## 2020-01-01 RX ADMIN — ENOXAPARIN SODIUM 70 MILLIGRAM(S): 100 INJECTION SUBCUTANEOUS at 05:41

## 2020-01-01 RX ADMIN — Medication 40 MILLIGRAM(S): at 12:04

## 2020-01-01 RX ADMIN — ALBUTEROL 2 PUFF(S): 90 AEROSOL, METERED ORAL at 04:12

## 2020-04-12 NOTE — ED PROVIDER NOTE - PROGRESS NOTE DETAILS
Spoke with son, he is deaf, but was able to hear. Spoke with son, he is deaf, but was able to hear me and understand, said that his father would want "everything done to survive". Discussed poor prognosis. Pt is improving with supplemental oxygen.

## 2020-04-12 NOTE — ED PROVIDER NOTE - CARE PLAN
Principal Discharge DX:	Hypoxia  Secondary Diagnosis:	ACS (acute coronary syndrome)  Secondary Diagnosis:	Viral pneumonia

## 2020-04-12 NOTE — ED PROVIDER NOTE - CLINICAL SUMMARY MEDICAL DECISION MAKING FREE TEXT BOX
Pt with hypoxia that is improving with supplemental oxygen, abnormal ekg, elevated trop with paced ekg, no chest pain, will admit. AWILDA Arce.

## 2020-04-13 NOTE — GOALS OF CARE CONVERSATION - ADVANCED CARE PLANNING - WHAT MATTERS MOST
Patient's daughter stated she does not want her father to suffer, however wants to honor his wishes.  Mrs. Piedra stated she recently had a discussion with her father at which time he stated " I want to live." Patient's daughter stated she does not want her father to suffer, however wants to honor his wishes.  Mrs. Piedra stated she recently had a discussion with her father at which time he stated " I want to live."  However, all parties agreed the patient did not realize what it would look like to live with life sustaining equipment.

## 2020-04-13 NOTE — ADVANCED PRACTICE NURSE CONSULT - ASSESSMENT
This is a 86yr old male patient admitted for Hypoxemia, presenting with the following:  -There is an intact Linear DTI to the Bilateral Gluteus and Sacral areas without drainage  -There is a L. Gluteal DTI (0.5cm x 0.5cm x 0.2cm) with epidermal separation, red tissue, and scant drainage  -There is a Stage 1 Pressure Injury to the Bilateral Heels, as evident by non-blanchable erythema This is a 86yr old male patient admitted for Hypoxemia, presenting with the following:  -There is an intact Linear DTI to the Bilateral Gluteus and Sacral areas without drainage  -There is a L. Gluteal DTI (0.5cm x 0.5cm x 0.2cm) with epidermal separation, red tissue, and scant drainage  -There is a Stage 1 Pressure Injury to the Bilateral Heels, as evident by non-blanchable erythema  -It is to be noted that the patient is being followed by Palliative Care  -The patient is with pressure injury prevention resources in place

## 2020-04-13 NOTE — H&P ADULT - PROBLEM SELECTOR PLAN 4
Improve score 2  Will start on heparin for dvt ppx  If d-dimer elevated, can consider heparin drip if no contra indications

## 2020-04-13 NOTE — GOALS OF CARE CONVERSATION - ADVANCED CARE PLANNING - NS PRO AD PATIENT TYPE
Health Care Proxy (HCP) Health Care Proxy (HCP)/Medical Orders for Life-Sustaining Treatment (MOLST)/Do Not Resuscitate (DNR)

## 2020-04-13 NOTE — H&P ADULT - PROBLEM SELECTOR PLAN 3
Pt on cozaar at home  Holding at present as BP in lower range  Monitor BP and start medication if SBP>160

## 2020-04-13 NOTE — H&P ADULT - PROBLEM SELECTOR PLAN 2
Pt coming in with elevated creatinine  Unknown baseline  s/p IVF in ED  F/u repeat levels  Avoid contrast and nephrotoxic medications

## 2020-04-13 NOTE — GOALS OF CARE CONVERSATION - ADVANCED CARE PLANNING - CONVERSATION DETAILS
PC  and PC NP contacted patient's daughter Jessica Piedra [phone] who identified herself as the patient's HCP.  Mrs. Piedra provided an illness review stating her father was admitted to [facility] 2019.  Daughter described the patient as alert and oriented enjoying ice cream and telling her "I want to live."   described her father as "not a complainer and enjoys life."  Mr. Schilling was employed as a  for [employer].  Patient has one son, one daughter, spouse is  and identifies as Hol[employer]. PC  and PC NP contacted patient's daughter Jessica Piedra [phone] who identified herself as the patient's HCP.  Mrs. Piedra provided an illness review stating her father was admitted to [facility] 2019.  Daughter described the patient as alert and oriented enjoying ice cream and telling her "I want to live."   described her father as "not a complainer and enjoys life."  Mr. Schilling was employed as a  for [employer].  Patient has one son, one daughter, spouse is  and identifies as Adventist. After further education and discussion with  who stated she updated her brother she stated the code status should be DNR/DNI.  Patient's daughter requested she speak with the patient which this Sw facilitated via telelphone.  No addl. needs were expressed at this time.  PC Sw has provided her contact information and will remain available as needed.

## 2020-04-13 NOTE — GOALS OF CARE CONVERSATION - ADVANCED CARE PLANNING - NS PRO AD PATIENT TYPE ON CHART
Health Care Proxy (HCP) DNI/Do Not Resuscitate (DNR)/Medical Orders for Life-Sustaining Treatment (MOLST)/Health Care Proxy (HCP)

## 2020-04-13 NOTE — H&P ADULT - ASSESSMENT
Pt is a 86 year old male from [facility] with PMH of osteoarthritis, bladder ca, emphysema, HF, gout, htn, CAD, BPH, anemia who was sent in for evaluation of hypoxia. As per daughter, Pt had fever few days backs and had been having some cough and increased RR, pt was started on Plaquenil 4 days ago, daughter unsure if pt was covid positive. Pt denies any pains at present, appears in mild shortness of breath  In ED, Pt was noted to be hypoxic to 80's on admission and was placed on NRB. Pt vitals were  Vital Signs Last 24 Hrs  T(F): 97.9   HR: 60   BP: 110/60  RR: 17   SpO2: 93%     GOC: GOC discussion done with pt who states he is unable to make decision at this point. States can speak with Daughter Jessica. Jessica  who states she asked pt 2 months ago and he wanted to be full code. Wants to speak with father before making any decision.

## 2020-04-13 NOTE — ADVANCED PRACTICE NURSE CONSULT - RECOMMEDATIONS
-Clean all wounds with normal saline and apply skin prep to the surrounding skin  -Apply TRIAD Moisture Barrier Cream to the Bilateral Gluteal and Sacral areas b.i.d. PRN  -Elevate/float the patients heels using heel protectors and reposition the patient Q 2hrs using wedges or pillows

## 2020-04-13 NOTE — H&P ADULT - PROBLEM SELECTOR PLAN 1
Pt coming in with fever, sob and cough for about 3 days   CXR with b/l infiltrates suggestive of COVID-19  COVID PCR testing  Pt completed 4 days of zithro and hcq at NH, holding as qtc 580  Will add supplemental vitamins including vit c, vit d  Will start on solu-medrol 40 q 8  Will continue with supplemental oxygen, increase support as needed  Will start on heparin with venodyne boots for dvt ppx  f/u inflamatory markers including ferritin, crp  f/u and trend d-dimer  Monitor EKG daily for QTC  Consider early proning if pt starts to desaturate on NRB

## 2020-04-13 NOTE — CHART NOTE - NSCHARTNOTEFT_GEN_A_CORE
Palliative Care:    Pt is a 86 year old man from [facility] with PMH of osteoarthritis, bladder ca, emphysema, HF, gout, htn, CAD, BPH, anemia who was sent in for evaluation of hypoxia.  CXR Patchy bilateral lung parenchymal airspace opacities.    Per pt's daughter Jessica Piedra ([phone]) he was  treated with Plaquenil and Robitussin daughter unsure if pt was COVID positive. Palliative Care:    Pt is a 86 year old man from [facility] with PMH of osteoarthritis, bladder ca, emphysema, HF, gout, htn, CAD, BPH, anemia who was sent in for evaluation of hypoxia.  CXR Patchy bilateral lung parenchymal airspace opacities.  Satting 95% on NRB.  Palliative care to establish goals of care.   Had phone discussion with pt's daughter Jessica Piedra ([phone]) regarding pt clinical status.  She  he was  treated with BID x 4 days Plaquenil and Robitussin daughter unsure if pt was COVID positive. Palliative Care:    Pt is a 86 year old man from [facility] with PMH of osteoarthritis, bladder ca, emphysema, HF, gout, htn, CAD, BPH, anemia who was sent in for evaluation of hypoxia. R/O COVID.   CXR Patchy bilateral lung parenchymal airspace opacities.  Satting 95% on NRB.  Palliative care to establish goals of care.   Had phone discussion with pt's daughter Jessica Piedra ([phone]) who is his HCP regarding pt clinical status and goals of care. She reports pt was treated with Plaquenil for 4 days however, she was clear if he was COVID positive. Subsequent phone discussion with [facility] RN Ms Randall who confirmed pt was treated empirically  with Plaquenil and Robitussin, but was not tested.     Regarding goals of care explained risks vs benefits of CPR and intubation given pt's clinical status he would not have meaningful recovery.      She expressed in past conversations with her father he expressed " I want to live" which she interprets as doing all medical interventions including CPR and trial of Intubation.    She said she needs time to discuss with her brother before making any decisions.  Palliative  care will follow. Pt remains a FULL CODE

## 2020-04-13 NOTE — H&P ADULT - HISTORY OF PRESENT ILLNESS
Pt is a 86 year old male from [facility] with PMH of osteoarthritis, bladder ca, emphysema, HF, gout, htn, CAD, BPH, anemia who was sent in for evaluation of hypoxia. As per daughter, Pt had fever few days backs and had been having some cough and increased RR, pt was started on Plaquenil 4 days ago, daughter unsure if pt was covid positive. Pt denies any pains at present, appears in mild shortness of breath  In ED, Pt was noted to be hypoxic to 80's on admission and was placed on NRB. Pt vitals were  Vital Signs Last 24 Hrs  T(F): 97.9   HR: 60   BP: 110/60  RR: 17   SpO2: 93%     GOC: GOC discussion done with pt who states he is unable to make decision at this point. States can speak with Daughter Jessica. Jessica  who states she asked pt 2 months ago and he wanted to be full code. Wants to speak with father before making any decision.

## 2020-04-13 NOTE — CHART NOTE - NSCHARTNOTEFT_GEN_A_CORE
patient was seen and evaluated at bed side , please see resident doctor's H&P section for full information, 86 yr old male from [facility], admit hospital for fever/acute hypoxic respiratory failure/suspected COVID-19 PNA/ARF, continue 100% NRM, airborne/contact isolation, palliative consult, supportive consult, sat 95% on 100% NRM, if continue full code, pulmonary consult.

## 2020-04-14 NOTE — PROGRESS NOTE ADULT - ATTENDING COMMENTS
patient was seen and evaluated at bed side, remain full code, continue all treatment, close monitor respiratory status, continue 100% NRM, airborne/contact isolation, DVT prophylaxis

## 2020-04-14 NOTE — CHART NOTE - NSCHARTNOTEFT_GEN_A_CORE
Palliative Care:    Pt is a 86 year old man from [facility] with PMH of osteoarthritis, bladder ca, emphysema, HF, gout, htn, CAD, BPH, anemia who was sent in for evaluation of hypoxia. R/O COVID.   CXR Patchy bilateral lung parenchymal airspace opacities.  Satting 95% on NRB.  Palliative care to establish goals of care.  Followed up with the patient's daughter Jessica Piedra via phone ([phone]) she expressed her dad has been intubated before and did well which is why she is struggling with making a decision.    Educated her as to pt's disease trajectory in setting of being COVID 19 positive.  Reviewed the risks vs benefits of cardiopulmonary resuscitation and intubation.    EMELY drafted per family's wishes DNR/DNI.  She would like to continue with conservative  medical management.  All questions answered.  Support provided.

## 2020-04-14 NOTE — PROGRESS NOTE ADULT - PROBLEM SELECTOR PLAN 1
Pt coming in with fever, sob and cough for about 3 days   CXR with b/l infiltrates suggestive of COVID-19  COVID +ve  Pt completed 4 days of zithro and hcq at NH, holding as qtc 580    Will continue with supplemental oxygen, increase support as needed  Will start on heparin with venodyne boots for dvt ppx  Consider early proning if pt starts to desaturate on NRB

## 2020-04-14 NOTE — PROGRESS NOTE ADULT - SUBJECTIVE AND OBJECTIVE BOX
PGY 1 Note discussed with supervising resident and primary attending    Patient is a 86y old  Male who presents with a chief complaint of Fever and sob (2020 01:54)      INTERVAL HPI/OVERNIGHT EVENTS:  Patient seen and examined at bed side , was more comfortable then yesterday. saturating 95% on non rebreather.  MEDICATIONS  (STANDING):  ALBUTerol    90 MICROgram(s) HFA Inhaler 2 Puff(s) Inhalation every 6 hours  ascorbic acid 500 milliGRAM(s) Oral daily  ergocalciferol 52270 Unit(s) Oral daily  heparin  Injectable 5000 Unit(s) SubCutaneous every 8 hours  methylPREDNISolone sodium succinate Injectable 40 milliGRAM(s) IV Push every 8 hours  polyethylene glycol 3350 17 Gram(s) Oral daily  tamsulosin 0.4 milliGRAM(s) Oral at bedtime  tiotropium 18 MICROgram(s) Capsule 1 Capsule(s) Inhalation daily    MEDICATIONS  (PRN):  acetaminophen    Suspension .. 650 milliGRAM(s) Oral every 6 hours PRN Temp greater or equal to 38C (100.4F)      __________________________________________________  REVIEW OF SYSTEMS:    CONSTITUTIONAL: No fever,   EYES: no acute visual disturbances  NECK: No pain or stiffness  RESPIRATORY: No cough; No shortness of breath  CARDIOVASCULAR: No chest pain, no palpitations  GASTROINTESTINAL: No pain. No nausea or vomiting; No diarrhea   NEUROLOGICAL: No headache or numbness, no tremors  MUSCULOSKELETAL: No joint pain, no muscle pain  GENITOURINARY: no dysuria, no frequency, no hesitancy  PSYCHIATRY: no depression , no anxiety  ALL OTHER  ROS negative        Vital Signs Last 24 Hrs  T(C): 36.9 (2020 05:06), Max: 36.9 (2020 05:06)  T(F): 98.5 (2020 05:06), Max: 98.5 (2020 05:06)  HR: 60 (2020 05:06) (58 - 105)  BP: 125/71 (2020 05:06) (104/42 - 125/71)  BP(mean): --  RR: 18 (2020 05:06) (18 - 22)  SpO2: 93% (2020 05:06) (85% - 95%)    ________________________________________________  PHYSICAL EXAM:  GENERAL: Elderly male.  HEENT: Normocephalic;  conjunctivae and sclerae clear; moist mucous membranes;   NECK : supple  CHEST/LUNG: decreased air entry B/l  HEART: S1 S2  regular; no murmurs, gallops or rubs  ABDOMEN: Soft, Nontender, Nondistended; Bowel sounds present  EXTREMITIES: no cyanosis; no edema; no calf tenderness  SKIN: warm and dry; no rash  NERVOUS SYSTEM:  Awake and alert; ; no new deficits    _________________________________________________  LABS:                        12.7   4.50  )-----------( 83       ( 2020 07:18 )             40.3     04-14    145  |  110<H>  |  40<H>  ----------------------------<  153<H>  4.5   |  27  |  1.31<H>    Ca    8.6      2020 07:18  Phos  3.6     04-14  Mg     2.8     04-14    TPro  6.9  /  Alb  2.2<L>  /  TBili  0.7  /  DBili  x   /  AST  27  /  ALT  10  /  AlkPhos  109  04-14    PT/INR - ( 2020 20:53 )   PT: 14.3 sec;   INR: 1.26 ratio         PTT - ( 2020 20:53 )  PTT:35.4 sec  Urinalysis Basic - ( 2020 09:56 )    Color: Yellow / Appearance: Clear / S.015 / pH: x  Gluc: x / Ketone: Trace  / Bili: Small / Urobili: 1   Blood: x / Protein: 100 / Nitrite: Negative   Leuk Esterase: Trace / RBC: 2-5 /HPF / WBC 3-5 /HPF   Sq Epi: x / Non Sq Epi: Occasional /HPF / Bacteria: Trace /HPF      CAPILLARY BLOOD GLUCOSE      POCT Blood Glucose.: 162 mg/dL (2020 07:54)  POCT Blood Glucose.: 152 mg/dL (2020 16:48)        RADIOLOGY & ADDITIONAL TESTS:    Imaging Personally Reviewed:  YES/NO    Consultant(s) Notes Reviewed:   YES/ No    Care Discussed with Consultants :     Plan of care was discussed with patient and /or primary care giver; all questions and concerns were addressed and care was aligned with patient's wishes.

## 2020-04-14 NOTE — PROGRESS NOTE ADULT - ASSESSMENT
Pt is a 86 year old male from [facility] with PMH of osteoarthritis, bladder ca, emphysema, HF, gout, htn, CAD, BPH, anemia who was sent in for evaluation of hypoxia. As per daughter, Pt had fever few days backs and had been having some cough and increased RR, pt was started on Plaquenil 4 days ago, daughter unsure if pt was covid positive. Pt denies any pains at present, appears in mild shortness of breath  In ED, Pt was noted to be hypoxic to 80's on admission and was placed on NRB. Pt vitals were  Vital Signs Last 24 Hrs  T(F): 97.9   HR: 60   BP: 110/60  RR: 17   SpO2: 93%     GOC: GOC discussion done with pt who states he is unable to make decision at this point. States can speak with Daughter Jessica. Jessica  who states she asked pt 2 months ago and he wanted to be full code. Wants to speak with father before making any decision.

## 2020-04-15 NOTE — PROGRESS NOTE ADULT - SUBJECTIVE AND OBJECTIVE BOX
Patient is a 86y old  Male who presents with a chief complaint of Fever and sob (14 Apr 2020 10:14)/acute hypoxic respiratory failure/due to COVID-19 PNA, still on 100% NRM, start interferon treatment, monitor labs, full code      INTERVAL HPI/OVERNIGHT EVENTS:  T(C): 36.3 (04-15-20 @ 05:18), Max: 37 (04-14-20 @ 10:49)  HR: 60 (04-15-20 @ 05:18) (60 - 61)  BP: 129/51 (04-15-20 @ 05:18) (103/44 - 129/51)  RR: 18 (04-15-20 @ 05:18) (18 - 18)  SpO2: 94% (04-15-20 @ 05:18) (92% - 98%)  Wt(kg): --    LABS:                        11.8   5.93  )-----------( 137      ( 15 Apr 2020 07:27 )             36.9     04-15    141  |  106  |  43<H>  ----------------------------<  118<H>  4.1   |  28  |  1.22    Ca    8.1<L>      15 Apr 2020 07:27  Phos  3.1     04-15  Mg     2.7     04-15    TPro  6.5  /  Alb  2.2<L>  /  TBili  0.6  /  DBili  x   /  AST  15  /  ALT  12  /  AlkPhos  107  04-15        CAPILLARY BLOOD GLUCOSE      POCT Blood Glucose.: 133 mg/dL (15 Apr 2020 08:34)  Ferritin, Serum (04.13.20 @ 10:08)    Ferritin, Serum: 784 ng/mL    POCT Blood Glucose.: 184 mg/dL (14 Apr 2020 21:55)  POCT Blood Glucose.: 151 mg/dL (14 Apr 2020 17:08)  POCT Blood Glucose.: 162 mg/dL (14 Apr 2020 11:54)    Troponin I, Serum (04.15.20 @ 07:27)    Troponin I, Serum: 0.030: The new reference range for Troponin-I performed on the Siemens Vista  system is 0.015-0.045 ng/mL, which includes the 99th percentile of a  healthy reference population. Studies have shown that elevated troponin  levels above the 99th percentile cutoff are associated with an increased  risk for adverse cardiac events, with the risk increasing as troponin  levels increase. As per a joint committee of the American College of  Cardiology and European Society of Cardiology, diagnosis of classic MI is  based upon the detection of a rise or fall of cardiac troponin values,  with at least one value above the 99th percentile upper reference limit,  in the appropriate clinical context.  Troponin-I (ng/mL) Interpretation  0.00-0.045 Normal range (includes the 99th percentile of a healthy  reference population)  >0.045 Elevated troponin level indicating increased risk  Note: Troponin-I and Troponin-T cannot be used interchangeably in serial  measurements. Minimally elevated Troponin results should be interpreted  in the context of clinical findings and risk factors. ng/mL    C-Reactive Protein, Serum (04.14.20 @ 09:10)    C-Reactive Protein, Serum: 13.39 mg/dL        RADIOLOGY & ADDITIONAL TESTS:  < from: Xray Chest 1 View-PORTABLE IMMEDIATE (04.12.20 @ 21:58) >    IMPRESSION: Patchy bilateral lung parenchymal airspace opacities identified, representing interval change, with an appearance most suggestive for bilateral pneumonia.    < end of copied text >    Consultant(s) Notes Reviewed:  [x ] YES  [ ] NO    PHYSICAL EXAM:  GENERAL: well built, well nourished  HEAD:  Atraumatic, Normocephalic  EYES: EOMI, PERRLA, conjunctiva and sclera clear  ENT: No tonsillar erythema, exudates, or enlargement; Moist mucous membranes, Good dentition, No lesions  NECK: Supple, No JVD, Normal thyroid, no enlarged nodes  NERVOUS SYSTEM:  Alert & Oriented X3, Good concentration; Motor Strength 5/5 B/L upper and lower extremities; DTRs 2+ intact and symmetric, sensory intact  CHEST/LUNG: B/L good air entry; No rales, rhonchi, or wheezing  HEART: S1S2 normal, no S3, Regular rate and rhythm; No murmurs, rubs, or gallops  ABDOMEN: Soft, Nontender, Nondistended; Bowel sounds present  EXTREMITIES:  2+ Peripheral Pulses, No clubbing, cyanosis, or edema  LYMPH: No lymphadenopathy noted  SKIN: No rashes or lesions    Care Discussed with Consultants/Other Providers [ x] YES  [ ] NO

## 2020-04-15 NOTE — DIETITIAN INITIAL EVALUATION ADULT. - PERTINENT MEDS FT
Home Medications:  Advair  mcg-21 mcg/inh inhalation aerosol: 2 puff(s) inhaled 2 times a day (12 Apr 2020 23:57)  allopurinol 100 mg oral tablet: 0.5 tab(s) orally 2 times a day (12 Apr 2020 23:57)  Colace 100 mg oral capsule: 1 cap(s) orally 2 times a day (12 Apr 2020 23:57)  Cozaar 25 mg oral tablet: 1 tab(s) orally once a day (12 Apr 2020 23:57)  Lasix 20 mg oral tablet: 1 tab(s) orally once a day (12 Apr 2020 23:57)  oxybutynin 10 mg/24 hr oral tablet, extended release: 1 tab(s) orally once a day (12 Apr 2020 23:57)  Plaquenil 200 mg oral tablet: 1 tab(s) orally 2 times a day (12 Apr 2020 23:57)  Spiriva 18 mcg inhalation capsule: 1 cap(s) inhaled once a day (12 Apr 2020 23:57)  tamsulosin 0.4 mg oral capsule: 1 cap(s) orally once a day (12 Apr 2020 23:57)  Zithromax 250 mg oral tablet:  (12 Apr 2020 23:57)  Zocor 10 mg oral tablet: 1 tab(s) orally once a day (at bedtime) (12 Apr 2020 23:57)

## 2020-04-15 NOTE — PROGRESS NOTE ADULT - PROBLEM SELECTOR PLAN 1
Pt coming in with fever, sob and cough for about 3 days   CXR with b/l infiltrates suggestive of COVID-19  COVID +ve  Pt completed 4 days of zithro and hcq at NH, holding as qtc 580    Will continue with supplemental oxygen, increase support as needed    Consider early proning if pt starts to desaturate on NRB

## 2020-04-15 NOTE — PROGRESS NOTE ADULT - SUBJECTIVE AND OBJECTIVE BOX
PGY 1 Note discussed with supervising resident and primary attending    Patient is a 86y old  Male who presents with a chief complaint of Fever and sob/acute hypoxic respiratory failure/due to COVID-19 PNA (15 Apr 2020 10:09)      INTERVAL HPI/OVERNIGHT EVENTS:  Patient seen and examined at bed side . no new complains noted. saturating 93% on non rebreather.               MEDICATIONS  (STANDING):  ALBUTerol    90 MICROgram(s) HFA Inhaler 2 Puff(s) Inhalation every 6 hours  anakinra Injectable   SubCutaneous   anakinra Injectable 100 milliGRAM(s) SubCutaneous every 6 hours  ascorbic acid 500 milliGRAM(s) Oral daily  ergocalciferol 89462 Unit(s) Oral daily  heparin  Injectable 5000 Unit(s) SubCutaneous every 8 hours  methylPREDNISolone sodium succinate Injectable 40 milliGRAM(s) IV Push every 12 hours  polyethylene glycol 3350 17 Gram(s) Oral daily  tamsulosin 0.4 milliGRAM(s) Oral at bedtime  tiotropium 18 MICROgram(s) Capsule 1 Capsule(s) Inhalation daily    MEDICATIONS  (PRN):  acetaminophen    Suspension .. 650 milliGRAM(s) Oral every 6 hours PRN Temp greater or equal to 38C (100.4F)      __________________________________________________  REVIEW OF SYSTEMS:    CONSTITUTIONAL: No fever,   EYES: no acute visual disturbances  NECK: No pain or stiffness  RESPIRATORY: No cough; No shortness of breath  CARDIOVASCULAR: No chest pain, no palpitations  GASTROINTESTINAL: No pain. No nausea or vomiting; No diarrhea   NEUROLOGICAL: No headache or numbness, no tremors  MUSCULOSKELETAL: No joint pain, no muscle pain  GENITOURINARY: no dysuria, no frequency, no hesitancy  PSYCHIATRY: no depression , no anxiety  ALL OTHER  ROS negative        Vital Signs Last 24 Hrs  T(C): 36.3 (15 Apr 2020 12:21), Max: 36.7 (14 Apr 2020 17:13)  T(F): 97.4 (15 Apr 2020 12:21), Max: 98.1 (14 Apr 2020 17:13)  HR: 60 (15 Apr 2020 12:21) (60 - 61)  BP: 124/72 (15 Apr 2020 12:21) (103/44 - 129/51)  BP(mean): --  RR: 17 (15 Apr 2020 12:21) (17 - 18)  SpO2: 93% (15 Apr 2020 12:21) (92% - 95%)    ________________________________________________  PHYSICAL EXAM:  GENERAL: Elderly male  HEENT: Normocephalic;  conjunctivae and sclerae clear; moist mucous membranes;   NECK : supple  CHEST/LUNG: decreased air entry  HEART: S1 S2  regular; no murmurs, gallops or rubs  ABDOMEN: Soft, Nontender, Nondistended; Bowel sounds present  EXTREMITIES: no cyanosis; no edema; no calf tenderness  SKIN: warm and dry; no rash  NERVOUS SYSTEM:  Awake and alert; AAO X 2  _________________________________________________  LABS:                        11.8   5.93  )-----------( 137      ( 15 Apr 2020 07:27 )             36.9     04-15    141  |  106  |  43<H>  ----------------------------<  118<H>  4.1   |  28  |  1.22    Ca    8.1<L>      15 Apr 2020 07:27  Phos  3.1     04-15  Mg     2.7     04-15    TPro  6.5  /  Alb  2.2<L>  /  TBili  0.6  /  DBili  x   /  AST  15  /  ALT  12  /  AlkPhos  107  04-15        CAPILLARY BLOOD GLUCOSE      POCT Blood Glucose.: 133 mg/dL (15 Apr 2020 08:34)  POCT Blood Glucose.: 184 mg/dL (14 Apr 2020 21:55)  POCT Blood Glucose.: 151 mg/dL (14 Apr 2020 17:08)        RADIOLOGY & ADDITIONAL TESTS:    Imaging Personally Reviewed:  YES/NO    Consultant(s) Notes Reviewed:   YES/ No    Care Discussed with Consultants :     Plan of care was discussed with patient and /or primary care giver; all questions and concerns were addressed and care was aligned with patient's wishes.

## 2020-04-15 NOTE — DIETITIAN INITIAL EVALUATION ADULT. - PERTINENT LABORATORY DATA
04-14 Na145 mmol/L Glu 153 mg/dL<H> K+ 4.5 mmol/L Cr  1.31 mg/dL<H> BUN 40 mg/dL<H>   04-14 Phos 3.6 mg/dL   04-14 Alb 2.2 g/dL<L>

## 2020-04-15 NOTE — PROGRESS NOTE ADULT - ASSESSMENT
Problem/Plan - 1:  ·  Problem: Acute hypoxemic respiratory failure.  Plan: Pt coming in with fever, sob and cough for about 3 days   CXR with b/l infiltrates suggestive of COVID-19  COVID +ve  Pt completed 4 days of zithro and hcq at NH, holding as qtc 580  start interferon treatment, DVT prophylaxis, close monitor respiratory status, F/U labs  Will continue with supplemental oxygen, increase support as needed  Will start on heparin with venodyne boots for dvt ppx  Consider early proning if pt starts to desaturate on NRB.   Remain full code  Problem/Plan - 2:  ·  Problem: PHILIP (acute kidney injury).  Plan: Pt coming in with elevated creatinine  Unknown baseline  s/p IVF in ED  F/u repeat levels  Avoid contrast and nephrotoxic medications.     Problem/Plan - 3:  ·  Problem: HTN (hypertension).  Plan: Pt on cozaar at home  Holding at present as BP in lower range  Monitor BP and start medication if SBP>160.     Problem/Plan - 4:  ·  Problem: Prophylactic measure.  Plan: Improve score 2  Will start on heparin for dvt ppx  If d-dimer elevated, can consider heparin drip if no contra indications.

## 2020-04-15 NOTE — DIETITIAN INITIAL EVALUATION ADULT. - OTHER INFO
Pt from skilled nursing facility, confused; COVID19+daisy, in airborne/contact isolation room; Spoke to RN, no nutrition related complaints, no GI distress reported at present; Limited intake/wt change history data available at present; Palliative consult noted

## 2020-04-15 NOTE — DIETITIAN INITIAL EVALUATION ADULT. - DIET TYPE
diet Rx at skilled nursing facility PTA: NO added salt, Cardiac, Regular consistency; Health Shake 4 oz x tid, LPS 30 ml daily Advance diet as medically feasible; Ensure Enlive  1can ( 240ml ) x bid ( 700 kcal, 40 g protein)

## 2020-04-15 NOTE — PROGRESS NOTE ADULT - ASSESSMENT
Pt is a 86 year old male from [facility] with PMH of osteoarthritis, bladder ca, emphysema, HF, gout, htn, CAD, BPH, anemia who was sent in for evaluation of hypoxia. As per daughter, Pt had fever few days backs and had been having some cough and increased RR, pt was started on Plaquenil 4 days ago, daughter unsure if pt was covid positive. Pt denies any pains at present, appears in mild shortness of breath  In ED, Pt was noted to be hypoxic to 80's on admission and was placed on NRB. Pt vitals were  Vital Signs Last 24 Hrs  T(F): 97.9   HR: 60   BP: 110/60  RR: 17   SpO2: 93%     GOC: GOC discussion done with pt who states he is unable to make decision at this point. States can speak with Daughter Jessica. Jessica  who states she asked pt 2 months ago and he wanted to be full code. Wants to speak with father before making any decision.

## 2020-04-16 NOTE — PROGRESS NOTE ADULT - ASSESSMENT
·  Problem: Acute hypoxemic respiratory failure.  Plan: Pt coming in with fever, sob and cough for about 3 days   CXR with b/l infiltrates suggestive of COVID-19  COVID +ve  Pt completed 4 days of zithro and hcq at NH, holding as qtc 580  start interferon treatment, DVT prophylaxis, close monitor respiratory status, F/U labs  Will continue with supplemental oxygen, increase support as needed  start full dose lovenox elevated D-dimer  continue 100% NRM  Remain full code  Problem/Plan - 2:  ·  Problem: PHILIP (acute kidney injury).  Plan: Pt coming in with elevated creatinine  Unknown baseline  s/p IVF in ED  F/u repeat levels  Avoid contrast and nephrotoxic medications.     Problem/Plan - 3:  ·  Problem: HTN (hypertension).  Plan: Pt on cozaar at home  Holding at present as BP in lower range  Monitor BP and start medication if SBP>160.     Problem/Plan - 4:  ·  Problem: Prophylactic measure.  Plan: Improve score 2  Will start on heparin for dvt ppx. Continue airborne/contact isolation

## 2020-04-16 NOTE — PROGRESS NOTE ADULT - SUBJECTIVE AND OBJECTIVE BOX
PGY 1 Note discussed with supervising resident and primary attending    Patient is a 86y old  Male who presents with a chief complaint of Fever/acute hypoxic respiratory failure (16 Apr 2020 10:48)      INTERVAL HPI/OVERNIGHT EVENTS: Patient seen and examined at bed side. on non rebreather saturating 93%.  MEDICATIONS  (STANDING):  ALBUTerol    90 MICROgram(s) HFA Inhaler 2 Puff(s) Inhalation every 6 hours  anakinra Injectable   SubCutaneous   anakinra Injectable 100 milliGRAM(s) SubCutaneous every 6 hours  ascorbic acid 500 milliGRAM(s) Oral daily  enoxaparin Injectable 70 milliGRAM(s) SubCutaneous two times a day  ergocalciferol 33622 Unit(s) Oral daily  methylPREDNISolone sodium succinate Injectable 40 milliGRAM(s) IV Push every 12 hours  polyethylene glycol 3350 17 Gram(s) Oral daily  tamsulosin 0.4 milliGRAM(s) Oral at bedtime  tiotropium 18 MICROgram(s) Capsule 1 Capsule(s) Inhalation daily    MEDICATIONS  (PRN):  acetaminophen    Suspension .. 650 milliGRAM(s) Oral every 6 hours PRN Temp greater or equal to 38C (100.4F)      __________________________________________________    Vital Signs Last 24 Hrs  T(C): 36.2 (16 Apr 2020 14:31), Max: 36.4 (15 Apr 2020 17:46)  T(F): 97.1 (16 Apr 2020 14:31), Max: 97.5 (15 Apr 2020 17:46)  HR: 95 (16 Apr 2020 14:31) (58 - 95)  BP: 114/63 (16 Apr 2020 14:31) (113/67 - 135/65)  BP(mean): --  RR: 18 (16 Apr 2020 14:31) (17 - 18)  SpO2: 95% (16 Apr 2020 14:31) (90% - 97%)    ________________________________________________  PHYSICAL EXAM:  GENERAL: NAD  HEENT: Normocephalic;  conjunctivae and sclerae clear; moist mucous membranes;   NECK : supple  CHEST/LUNG:decreased air entry B/L  HEART: S1 S2  regular; no murmurs, gallops or rubs  ABDOMEN: Soft, Nontender, Nondistended; Bowel sounds present  EXTREMITIES: no cyanosis; no edema; no calf tenderness  SKIN: warm and dry; no rash  NERVOUS SYSTEM:  Awake and alert; Oriented  to place, person and time ; no new deficits    _________________________________________________  LABS:                        12.5   5.39  )-----------( 105      ( 16 Apr 2020 07:33 )             39.3     04-16    139  |  105  |  46<H>  ----------------------------<  113<H>  4.3   |  31  |  1.18    Ca    8.6      16 Apr 2020 07:33  Phos  3.1     04-16  Mg     2.8     04-16    TPro  6.5  /  Alb  2.3<L>  /  TBili  0.6  /  DBili  x   /  AST  15  /  ALT  13  /  AlkPhos  109  04-16        CAPILLARY BLOOD GLUCOSE      POCT Blood Glucose.: 145 mg/dL (16 Apr 2020 12:17)  POCT Blood Glucose.: 225 mg/dL (16 Apr 2020 08:30)  POCT Blood Glucose.: 161 mg/dL (15 Apr 2020 21:35)        RADIOLOGY & ADDITIONAL TESTS:    Imaging Personally Reviewed:  YES/NO    Consultant(s) Notes Reviewed:   YES/ No    Care Discussed with Consultants :     Plan of care was discussed with patient and /or primary care giver; all questions and concerns were addressed and care was aligned with patient's wishes.

## 2020-04-16 NOTE — PROGRESS NOTE ADULT - SUBJECTIVE AND OBJECTIVE BOX
Patient is a 86y old  Male who presents with a chief complaint of Fever/acute hypoxic respiratory failure/due to COVID-19 PNA/PHILIP, still on 100% NRM      INTERVAL HPI/OVERNIGHT EVENTS:  T(C): 36.4 (04-16-20 @ 06:28), Max: 36.4 (04-15-20 @ 17:46)  HR: 58 (04-16-20 @ 06:28) (55 - 60)  BP: 122/58 (04-16-20 @ 06:28) (100/55 - 135/65)  RR: 18 (04-16-20 @ 06:28) (17 - 18)  SpO2: 97% (04-16-20 @ 06:28) (90% - 97%)  Wt(kg): --    LABS:                        12.5   5.39  )-----------( x        ( 16 Apr 2020 07:33 )             39.3     04-16    139  |  105  |  46<H>  ----------------------------<  113<H>  4.3   |  31  |  1.18    Ca    8.6      16 Apr 2020 07:33  Phos  3.1     04-16  Mg     2.8     04-16    TPro  6.5  /  Alb  2.3<L>  /  TBili  0.6  /  DBili  x   /  AST  15  /  ALT  13  /  AlkPhos  109  04-16        CAPILLARY BLOOD GLUCOSE      POCT Blood Glucose.: 225 mg/dL (16 Apr 2020 08:30)  POCT Blood Glucose.: 161 mg/dL (15 Apr 2020 21:35)    Ferritin, Serum (04.16.20 @ 09:52)    Ferritin, Serum: 560 ng/mL    C-Reactive Protein, Serum (04.16.20 @ 09:38)    C-Reactive Protein, Serum: 3.20 mg/dL    D-Dimer Assay, Quantitative (04.16.20 @ 07:33)    D-Dimer Assay, Quantitative: 3662 ng/mL DDU        RADIOLOGY & ADDITIONAL TESTS:    Consultant(s) Notes Reviewed:  [x ] YES  [ ] NO    PHYSICAL EXAM:  GENERAL: well built, well nourished  HEAD:  Atraumatic, Normocephalic  EYES: EOMI, PERRLA, conjunctiva and sclera clear  ENT: No tonsillar erythema, exudates, or enlargement; Moist mucous membranes, Good dentition, No lesions  NECK: Supple, No JVD, Normal thyroid, no enlarged nodes  NERVOUS SYSTEM:  Alert & Oriented X3, Good concentration; Motor Strength 5/5 B/L upper and lower extremities; DTRs 2+ intact and symmetric, sensory intact  CHEST/LUNG: B/L  air entry diminished; No rales, rhonchi, or wheezing  HEART: S1S2 normal, no S3, Regular rate and rhythm; No murmurs, rubs, or gallops  ABDOMEN: Soft, Nontender, Nondistended; Bowel sounds present  EXTREMITIES:  2+ Peripheral Pulses, No clubbing, cyanosis, or edema  LYMPH: No lymphadenopathy noted  SKIN: No rashes or lesions    Care Discussed with Consultants/Other Providers [ x] YES  [ ] NO

## 2020-04-16 NOTE — PROGRESS NOTE ADULT - ASSESSMENT
Pt is a 86 year old male from [facility] with PMH of osteoarthritis, bladder ca, emphysema, HF, gout, htn, CAD, BPH, anemia who was sent in for evaluation of hypoxia. As per daughter, Pt had fever few days backs and had been having some cough and increased RR, pt was started on Plaquenil 4 days ago, daughter unsure if pt was covid positive. Pt denies any pains at present, appears in mild shortness of breath  In ED, Pt was noted to be hypoxic to 80's on admission and was placed on NRB. Pt vitals were  Vital Signs Last 24 Hrs  T(F): 97.9   HR: 60   BP: 110/60  RR: 17   SpO2: 93%     GOC: GOC discussion done with pt who states he is unable to make decision at this point. States can speak with Daughter Jessica. Jessica  who states she asked pt 2 months ago and he wanted to be full code. Wants to speak with father before making any decision.

## 2020-04-17 NOTE — PROGRESS NOTE ADULT - SUBJECTIVE AND OBJECTIVE BOX
PGY 1 Note discussed with supervising resident and primary attending    Patient is a 86y old  Male who presents with a chief complaint of Fever and sob/acute hypoxic respiratory fail;ure/dut to COVID-19 PNA (17 Apr 2020 10:42)      INTERVAL HPI/OVERNIGHT EVENTS: offers no new complaints; current symptoms resolving    MEDICATIONS  (STANDING):  ALBUTerol    90 MICROgram(s) HFA Inhaler 2 Puff(s) Inhalation every 6 hours  anakinra Injectable   SubCutaneous   anakinra Injectable 100 milliGRAM(s) SubCutaneous every 6 hours  ascorbic acid 500 milliGRAM(s) Oral daily  enoxaparin Injectable 70 milliGRAM(s) SubCutaneous two times a day  methylPREDNISolone sodium succinate Injectable 40 milliGRAM(s) IV Push every 12 hours  polyethylene glycol 3350 17 Gram(s) Oral daily  tamsulosin 0.4 milliGRAM(s) Oral at bedtime  tiotropium 18 MICROgram(s) Capsule 1 Capsule(s) Inhalation daily    MEDICATIONS  (PRN):  acetaminophen    Suspension .. 650 milliGRAM(s) Oral every 6 hours PRN Temp greater or equal to 38C (100.4F)  morphine  - Injectable 1 milliGRAM(s) IV Push every 2 hours PRN Respiratory distress      __________________________________________________  REVIEW OF SYSTEMS:    CONSTITUTIONAL: No fever,   EYES: no acute visual disturbances  NECK: No pain or stiffness  RESPIRATORY: No cough; No shortness of breath  CARDIOVASCULAR: No chest pain, no palpitations  GASTROINTESTINAL: No pain. No nausea or vomiting; No diarrhea   NEUROLOGICAL: No headache or numbness, no tremors  MUSCULOSKELETAL: No joint pain, no muscle pain  GENITOURINARY: no dysuria, no frequency, no hesitancy  PSYCHIATRY: no depression , no anxiety  ALL OTHER  ROS negative        Vital Signs Last 24 Hrs  T(C): 36.6 (17 Apr 2020 14:38), Max: 36.6 (17 Apr 2020 02:03)  T(F): 97.9 (17 Apr 2020 14:38), Max: 97.9 (17 Apr 2020 11:56)  HR: 58 (17 Apr 2020 14:38) (58 - 61)  BP: 125/51 (17 Apr 2020 14:38) (97/45 - 125/51)  BP(mean): --  RR: 18 (17 Apr 2020 14:38) (17 - 19)  SpO2: 100% (17 Apr 2020 14:38) (87% - 100%)    ________________________________________________  PHYSICAL EXAM:  GENERAL: NAD  HEENT: Normocephalic;  conjunctivae and sclerae clear; moist mucous membranes;   NECK : supple  CHEST/LUNG: Clear to auscultation bilaterally with good air entry   HEART: S1 S2  regular; no murmurs, gallops or rubs  ABDOMEN: Soft, Nontender, Nondistended; Bowel sounds present  EXTREMITIES: no cyanosis; no edema; no calf tenderness  SKIN: warm and dry; no rash  NERVOUS SYSTEM:  Awake and alert; Oriented  to place, person and time ; no new deficits    _________________________________________________  LABS:                        12.9   5.83  )-----------( 119      ( 17 Apr 2020 06:40 )             41.1     04-17    140  |  106  |  44<H>  ----------------------------<  102<H>  4.6   |  28  |  0.99    Ca    8.5      17 Apr 2020 06:40  Phos  3.1     04-17  Mg     2.7     04-17    TPro  6.5  /  Alb  2.4<L>  /  TBili  0.6  /  DBili  x   /  AST  17  /  ALT  14  /  AlkPhos  105  04-17        CAPILLARY BLOOD GLUCOSE      POCT Blood Glucose.: 117 mg/dL (17 Apr 2020 12:05)  POCT Blood Glucose.: 112 mg/dL (17 Apr 2020 08:42)  POCT Blood Glucose.: 135 mg/dL (17 Apr 2020 00:21)  POCT Blood Glucose.: 107 mg/dL (16 Apr 2020 17:08)        RADIOLOGY & ADDITIONAL TESTS:    Imaging Personally Reviewed:  YES/NO    Consultant(s) Notes Reviewed:   YES/ No    Care Discussed with Consultants :     Plan of care was discussed with patient and /or primary care giver; all questions and concerns were addressed and care was aligned with patient's wishes.

## 2020-04-17 NOTE — PROGRESS NOTE ADULT - ASSESSMENT
86 yr old male from NH, H/O HTN/cAD/emphysema/bladder ca/GERD/anemia, admit hospital for fever/SOB/acute hypoxic respiratory failure/due to COVID-19 PNA/PHILIP, continue on interferon treatment/lovenox full dose/steroid/droplet precaution, still on 100% NRM, mld worseing SOB, repeat  CXR, one dose lasix, monitor lab, full code. close monitor respiratory status

## 2020-04-17 NOTE — PROGRESS NOTE ADULT - ASSESSMENT
Pt is a 86 year old male from [facility] with PMH of osteoarthritis, bladder ca, emphysema, HF, gout, htn, CAD, BPH, anemia who was sent in for evaluation of hypoxia. As per daughter, Pt had fever few days backs and had been having some cough and increased RR, pt was started on Plaquenil 4 days ago, daughter unsure if pt was covid positive. Pt denies any pains at present, appears in mild shortness of breath  In ED, Pt was noted to be hypoxic to 80's on admission and was placed on NRB. Pt vitals were  Vital Signs Last 24 Hrs  T(F): 97.9   HR: 60   BP: 110/60  RR: 17   SpO2: 93%     GOC:  DNR/DNI

## 2020-04-17 NOTE — PROGRESS NOTE ADULT - SUBJECTIVE AND OBJECTIVE BOX
Patient is a 86y old  Male who presents with a chief complaint of Fever and sob (16 Apr 2020 16:09)/acute hypoxic respiratory failure/due to COVID-19 PNA/PHILIP, still on 100% NRM, on inteferon/steroid, still SOB, one dose lasix, CXR today      INTERVAL HPI/OVERNIGHT EVENTS:  T(C): 36.5 (04-17-20 @ 05:19), Max: 36.6 (04-17-20 @ 02:03)  HR: 59 (04-17-20 @ 05:19) (59 - 95)  BP: 121/61 (04-17-20 @ 05:19) (97/45 - 121/61)  RR: 18 (04-17-20 @ 05:19) (17 - 19)  SpO2: 90% (04-17-20 @ 05:19) (87% - 96%)  Wt(kg): --    LABS:                        12.9   5.83  )-----------( 119      ( 17 Apr 2020 06:40 )             41.1     04-17    140  |  106  |  44<H>  ----------------------------<  102<H>  4.6   |  28  |  0.99    Ca    8.5      17 Apr 2020 06:40  Phos  3.1     04-17  Mg     2.7     04-17    TPro  6.5  /  Alb  2.4<L>  /  TBili  0.6  /  DBili  x   /  AST  17  /  ALT  14  /  AlkPhos  105  04-17        CAPILLARY BLOOD GLUCOSE      POCT Blood Glucose.: 112 mg/dL (17 Apr 2020 08:42)  POCT Blood Glucose.: 135 mg/dL (17 Apr 2020 00:21)  POCT Blood Glucose.: 107 mg/dL (16 Apr 2020 17:08)  POCT Blood Glucose.: 145 mg/dL (16 Apr 2020 12:17)        RADIOLOGY & ADDITIONAL TESTS:    Consultant(s) Notes Reviewed:  [x ] YES  [ ] NO    PHYSICAL EXAM:  GENERAL: well built, well nourished  HEAD:  Atraumatic, Normocephalic  EYES: EOMI, PERRLA, conjunctiva and sclera clear  ENT: No tonsillar erythema, exudates, or enlargement; Moist mucous membranes, Good dentition, No lesions  NECK: Supple, No JVD, Normal thyroid, no enlarged nodes  NERVOUS SYSTEM:  Alert & Oriented X3, Good concentration; Motor Strength 5/5 B/L upper and lower extremities; DTRs 2+ intact and symmetric, sensory intact  CHEST/LUNG: B/L good air entry; positive rales positive crackles   HEART: S1S2 normal, no S3, Regular rate and rhythm; No murmurs, rubs, or gallops  ABDOMEN: Soft, Nontender, Nondistended; Bowel sounds present  EXTREMITIES:  2+ Peripheral Pulses, No clubbing, cyanosis, or edema  LYMPH: No lymphadenopathy noted  SKIN: No rashes or lesions    Care Discussed with Consultants/Other Providers [ x] YES  [ ] NO

## 2020-04-18 NOTE — PROGRESS NOTE ADULT - REASON FOR ADMISSION
Fever and sob
Fever and sob/acute hypoxic respiratory fail;ure/dut to COVID-19 PNA
Fever and sob/acute hypoxic respiratory failure/due to COVID-19 PNA
Fever/acute hypoxic respiratory failure
Fever and sob

## 2020-04-18 NOTE — DISCHARGE NOTE FOR THE EXPIRED PATIENT - HOSPITAL COURSE
[facility] is a 86 [facility]H of osteoarthritis, bladder ca, emphysema, HF, gout, htn, CAD, BPH, anemia who was sent in for evaluation of hypoxia. As per daughter, Pt had fever few days backs and had been having some cough and increased RR, pt was started on Plaquenil 4 days ago, daughter unsure if pt was covid positive. Pt denies any pains at present, appears in mild shortness of breath  In ED, Pt was noted to be hypoxic to 80's on admission and was placed on NRB. Pt vitals were  Vital Signs Last 24 Hrs  T(F): 97.9   HR: 60   BP: 110/60  RR: 17   SpO2: 93%   pT was admitted for COVID Pneumonia     “Called by nurse about patient being unresponsive and in asystole. Patient seen at bedside. On examination, patient was unresponsive, bilateral pupils dilated, non-responsive to light, no bilateral conjunctival reflex present, no heart sounds auscultated, no spontaneous breath sounds present, no peripheral pulses present. EKG showed aystole. Patient pronounced dead at 11:10pm  Attending, Dr. Arce informed regarding patient’s status. No family present at bedside. Dr. Martinez tried to inform pt's daughter but was unable to reach family, voicemail left to call back

## 2020-04-18 NOTE — PROGRESS NOTE ADULT - SUBJECTIVE AND OBJECTIVE BOX
Patient is a 86y old  Male who presents with a chief complaint of Fever and sob (17 Apr 2020 14:53)      INTERVAL HPI/OVERNIGHT EVENTS:  T(C): 37.5 (04-18-20 @ 10:24), Max: 37.5 (04-18-20 @ 10:24)  HR: 59 (04-18-20 @ 10:24) (59 - 73)  BP: 90/48 (04-18-20 @ 10:24) (90/48 - 126/77)  RR: 29 (04-18-20 @ 10:24) (18 - 29)  SpO2: 84% (04-18-20 @ 10:24) (84% - 95%)  Wt(kg): --  I&O's Summary      LABS:                        15.2   21.06 )-----------( 241      ( 18 Apr 2020 06:23 )             47.9     04-18    137  |  101  |  60<H>  ----------------------------<  120<H>  5.6<H>   |  22  |  2.09<H>    Ca    9.0      18 Apr 2020 06:23  Phos  6.1     04-18  Mg     3.0     04-18    TPro  7.8  /  Alb  3.0<L>  /  TBili  1.1  /  DBili  x   /  AST  124<H>  /  ALT  116<H>  /  AlkPhos  126<H>  04-18        CAPILLARY BLOOD GLUCOSE      POCT Blood Glucose.: 117 mg/dL (18 Apr 2020 12:17)  POCT Blood Glucose.: 117 mg/dL (18 Apr 2020 08:49)  POCT Blood Glucose.: 187 mg/dL (17 Apr 2020 22:33)  POCT Blood Glucose.: 121 mg/dL (17 Apr 2020 17:05)        RADIOLOGY & ADDITIONAL TESTS:    Consultant(s) Notes Reviewed:  [x ] YES  [ ] NO    MEDICATIONS  (STANDING):  ALBUTerol    90 MICROgram(s) HFA Inhaler 2 Puff(s) Inhalation every 6 hours  anakinra Injectable   SubCutaneous   anakinra Injectable 100 milliGRAM(s) SubCutaneous every 12 hours  ascorbic acid 500 milliGRAM(s) Oral daily  enoxaparin Injectable 70 milliGRAM(s) SubCutaneous two times a day  polyethylene glycol 3350 17 Gram(s) Oral daily  tamsulosin 0.4 milliGRAM(s) Oral at bedtime  tiotropium 18 MICROgram(s) Capsule 1 Capsule(s) Inhalation daily    MEDICATIONS  (PRN):  acetaminophen    Suspension .. 650 milliGRAM(s) Oral every 6 hours PRN Temp greater or equal to 38C (100.4F)      PHYSICAL EXAM:  GENERAL: well built, well nourished  HEAD:  Atraumatic, Normocephalic  EYES: EOMI, PERRLA, conjunctiva and sclera clear  ENT: No tonsillar erythema, exudates, or enlargement; Moist mucous membranes, Good dentition, No lesions  NECK: Supple, No JVD, Normal thyroid, no enlarged nodes  NERVOUS SYSTEM:  Alert & Oriented X3, Good concentration; Motor Strength 5/5 B/L upper and lower extremities; DTRs 2+ intact and symmetric, sensory intact  CHEST/LUNG: B/L fair air entry; Few basal rales, No rhonchi, or wheezing  HEART: S1S2 tachy, no S3, Regular rate and rhythm; No murmurs, rubs, or gallops  ABDOMEN: Soft, Nontender, Nondistended; Bowel sounds present  EXTREMITIES:  2+ Peripheral Pulses, No clubbing, cyanosis, or edema  LYMPH: No lymphadenopathy noted  SKIN: No rashes or lesions    Care Discussed with Consultants/Other Providers [ x] YES  [ ] NO

## 2020-05-28 PROCEDURE — 85730 THROMBOPLASTIN TIME PARTIAL: CPT

## 2020-05-28 PROCEDURE — 84484 ASSAY OF TROPONIN QUANT: CPT

## 2020-05-28 PROCEDURE — 71045 X-RAY EXAM CHEST 1 VIEW: CPT

## 2020-05-28 PROCEDURE — 83615 LACTATE (LD) (LDH) ENZYME: CPT

## 2020-05-28 PROCEDURE — 82550 ASSAY OF CK (CPK): CPT

## 2020-05-28 PROCEDURE — 82962 GLUCOSE BLOOD TEST: CPT

## 2020-05-28 PROCEDURE — 87635 SARS-COV-2 COVID-19 AMP PRB: CPT

## 2020-05-28 PROCEDURE — 86140 C-REACTIVE PROTEIN: CPT

## 2020-05-28 PROCEDURE — 84100 ASSAY OF PHOSPHORUS: CPT

## 2020-05-28 PROCEDURE — 83735 ASSAY OF MAGNESIUM: CPT

## 2020-05-28 PROCEDURE — 85379 FIBRIN DEGRADATION QUANT: CPT

## 2020-05-28 PROCEDURE — 80053 COMPREHEN METABOLIC PANEL: CPT

## 2020-05-28 PROCEDURE — 36415 COLL VENOUS BLD VENIPUNCTURE: CPT

## 2020-05-28 PROCEDURE — 83880 ASSAY OF NATRIURETIC PEPTIDE: CPT

## 2020-05-28 PROCEDURE — 81001 URINALYSIS AUTO W/SCOPE: CPT

## 2020-05-28 PROCEDURE — 85610 PROTHROMBIN TIME: CPT

## 2020-05-28 PROCEDURE — 93005 ELECTROCARDIOGRAM TRACING: CPT

## 2020-05-28 PROCEDURE — 82728 ASSAY OF FERRITIN: CPT

## 2020-05-28 PROCEDURE — 94640 AIRWAY INHALATION TREATMENT: CPT

## 2020-05-28 PROCEDURE — 85027 COMPLETE CBC AUTOMATED: CPT

## 2020-05-28 PROCEDURE — 99285 EMERGENCY DEPT VISIT HI MDM: CPT | Mod: 25

## 2022-09-26 NOTE — ED PROVIDER NOTE - CONSTITUTIONAL MENTATION
-- DO NOT REPLY / DO NOT REPLY ALL --  -- Message is from Engagement Center Operations (ECO) --    Provider paged via [employer] Documentation - The below message was copied and pasted from a [employer] page:    Initiated Date/Time  9/26/2022 4:09 pm  Message Sent Date/Time  9/26/2022 4:11 pm  Source  [facility] Contact Center  Department  ACC  Method  Secure Text  Contacted  Carolyn Joseph MD  Details  Patient  Message  [phone] ACC CALLER NAME: AISSATOU ORDOÑEZ REQUESTED PHYSICIAN: CAROLYN JOSEPH RE: ADIRÁN THOMPSON PATIENT 1993 MRN: [id number] PATIENT PCP: CAROLYN JOSEPH IF RX, [id number] - [place] [id number] AT [facility] [address] [phone] DOCTOR NEEDS TO CALL PATIENT OR WIFE BACK REGARDING ADDITIONAL BLOOD PRESSURE 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 'Sign and Close Workspace'   [email or web address]
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
